# Patient Record
Sex: MALE | Race: WHITE | NOT HISPANIC OR LATINO | URBAN - METROPOLITAN AREA
[De-identification: names, ages, dates, MRNs, and addresses within clinical notes are randomized per-mention and may not be internally consistent; named-entity substitution may affect disease eponyms.]

---

## 2017-08-01 ENCOUNTER — OUTPATIENT (OUTPATIENT)
Dept: OUTPATIENT SERVICES | Facility: HOSPITAL | Age: 67
LOS: 1 days | End: 2017-08-01
Payer: MEDICAID

## 2017-08-01 PROCEDURE — G9001: CPT

## 2017-08-05 ENCOUNTER — EMERGENCY (EMERGENCY)
Facility: HOSPITAL | Age: 67
LOS: 1 days | End: 2017-08-05
Attending: EMERGENCY MEDICINE | Admitting: EMERGENCY MEDICINE
Payer: MEDICARE

## 2017-08-05 ENCOUNTER — INPATIENT (INPATIENT)
Facility: HOSPITAL | Age: 67
LOS: 4 days | Discharge: HOME | DRG: 41 | End: 2017-08-10
Attending: PSYCHIATRY & NEUROLOGY | Admitting: PSYCHIATRY & NEUROLOGY
Payer: MEDICARE

## 2017-08-05 ENCOUNTER — TRANSCRIPTION ENCOUNTER (OUTPATIENT)
Age: 67
End: 2017-08-05

## 2017-08-05 VITALS
SYSTOLIC BLOOD PRESSURE: 173 MMHG | RESPIRATION RATE: 13 BRPM | HEART RATE: 78 BPM | DIASTOLIC BLOOD PRESSURE: 103 MMHG | OXYGEN SATURATION: 98 %

## 2017-08-05 VITALS
SYSTOLIC BLOOD PRESSURE: 164 MMHG | OXYGEN SATURATION: 97 % | WEIGHT: 146.83 LBS | TEMPERATURE: 98 F | HEART RATE: 100 BPM | HEIGHT: 71 IN | DIASTOLIC BLOOD PRESSURE: 96 MMHG | RESPIRATION RATE: 12 BRPM

## 2017-08-05 VITALS
DIASTOLIC BLOOD PRESSURE: 93 MMHG | TEMPERATURE: 98 F | HEART RATE: 74 BPM | WEIGHT: 105.38 LBS | SYSTOLIC BLOOD PRESSURE: 173 MMHG | RESPIRATION RATE: 16 BRPM | OXYGEN SATURATION: 97 %

## 2017-08-05 DIAGNOSIS — I63.9 CEREBRAL INFARCTION, UNSPECIFIED: ICD-10-CM

## 2017-08-05 PROBLEM — Z00.00 ENCOUNTER FOR PREVENTIVE HEALTH EXAMINATION: Status: ACTIVE | Noted: 2017-08-05

## 2017-08-05 LAB
ALBUMIN SERPL ELPH-MCNC: 3.8 G/DL — SIGNIFICANT CHANGE UP (ref 3.3–5)
ALP SERPL-CCNC: 73 U/L — SIGNIFICANT CHANGE UP (ref 30–120)
ALT FLD-CCNC: 25 U/L DA — SIGNIFICANT CHANGE UP (ref 10–60)
ANION GAP SERPL CALC-SCNC: 9 MMOL/L — SIGNIFICANT CHANGE UP (ref 5–17)
APTT BLD: 30.7 SEC — SIGNIFICANT CHANGE UP (ref 27.5–37.4)
AST SERPL-CCNC: 15 U/L — SIGNIFICANT CHANGE UP (ref 10–40)
BASOPHILS # BLD AUTO: 0.1 K/UL — SIGNIFICANT CHANGE UP (ref 0–0.2)
BASOPHILS NFR BLD AUTO: 0.8 % — SIGNIFICANT CHANGE UP (ref 0–2)
BILIRUB SERPL-MCNC: 0.6 MG/DL — SIGNIFICANT CHANGE UP (ref 0.2–1.2)
BLD GP AB SCN SERPL QL: SIGNIFICANT CHANGE UP
BUN SERPL-MCNC: 12 MG/DL — SIGNIFICANT CHANGE UP (ref 7–23)
CALCIUM SERPL-MCNC: 8.7 MG/DL — SIGNIFICANT CHANGE UP (ref 8.4–10.5)
CHLORIDE SERPL-SCNC: 102 MMOL/L — SIGNIFICANT CHANGE UP (ref 96–108)
CK MB BLD-MCNC: 2.3 % — SIGNIFICANT CHANGE UP (ref 0–3.5)
CK MB CFR SERPL CALC: 1.6 NG/ML — SIGNIFICANT CHANGE UP (ref 0–3.6)
CK SERPL-CCNC: 70 U/L — SIGNIFICANT CHANGE UP (ref 39–308)
CO2 SERPL-SCNC: 28 MMOL/L — SIGNIFICANT CHANGE UP (ref 22–31)
CREAT SERPL-MCNC: 0.75 MG/DL — SIGNIFICANT CHANGE UP (ref 0.5–1.3)
EOSINOPHIL # BLD AUTO: 0.2 K/UL — SIGNIFICANT CHANGE UP (ref 0–0.5)
EOSINOPHIL NFR BLD AUTO: 2.2 % — SIGNIFICANT CHANGE UP (ref 0–6)
GLUCOSE SERPL-MCNC: 112 MG/DL — HIGH (ref 70–99)
HCT VFR BLD CALC: 44.3 % — SIGNIFICANT CHANGE UP (ref 39–50)
HGB BLD-MCNC: 15.2 G/DL — SIGNIFICANT CHANGE UP (ref 13–17)
INR BLD: 1.08 RATIO — SIGNIFICANT CHANGE UP (ref 0.88–1.16)
LYMPHOCYTES # BLD AUTO: 1.8 K/UL — SIGNIFICANT CHANGE UP (ref 1–3.3)
LYMPHOCYTES # BLD AUTO: 19.5 % — SIGNIFICANT CHANGE UP (ref 13–44)
MCHC RBC-ENTMCNC: 31.9 PG — SIGNIFICANT CHANGE UP (ref 27–34)
MCHC RBC-ENTMCNC: 34.4 GM/DL — SIGNIFICANT CHANGE UP (ref 32–36)
MCV RBC AUTO: 92.8 FL — SIGNIFICANT CHANGE UP (ref 80–100)
MONOCYTES # BLD AUTO: 0.8 K/UL — SIGNIFICANT CHANGE UP (ref 0–0.9)
MONOCYTES NFR BLD AUTO: 8.4 % — SIGNIFICANT CHANGE UP (ref 2–14)
NEUTROPHILS # BLD AUTO: 6.3 K/UL — SIGNIFICANT CHANGE UP (ref 1.8–7.4)
NEUTROPHILS NFR BLD AUTO: 69.1 % — SIGNIFICANT CHANGE UP (ref 43–77)
PLATELET # BLD AUTO: 214 K/UL — SIGNIFICANT CHANGE UP (ref 150–400)
POTASSIUM SERPL-MCNC: 3.9 MMOL/L — SIGNIFICANT CHANGE UP (ref 3.5–5.3)
POTASSIUM SERPL-SCNC: 3.9 MMOL/L — SIGNIFICANT CHANGE UP (ref 3.5–5.3)
PROT SERPL-MCNC: 7.1 G/DL — SIGNIFICANT CHANGE UP (ref 6–8.3)
PROTHROM AB SERPL-ACNC: 11.8 SEC — SIGNIFICANT CHANGE UP (ref 9.8–12.7)
RBC # BLD: 4.78 M/UL — SIGNIFICANT CHANGE UP (ref 4.2–5.8)
RBC # FLD: 11.1 % — SIGNIFICANT CHANGE UP (ref 10.3–14.5)
SODIUM SERPL-SCNC: 139 MMOL/L — SIGNIFICANT CHANGE UP (ref 135–145)
TROPONIN I SERPL-MCNC: 0 NG/ML — LOW (ref 0.02–0.06)
WBC # BLD: 9.1 K/UL — SIGNIFICANT CHANGE UP (ref 3.8–10.5)
WBC # FLD AUTO: 9.1 K/UL — SIGNIFICANT CHANGE UP (ref 3.8–10.5)

## 2017-08-05 PROCEDURE — 70498 CT ANGIOGRAPHY NECK: CPT | Mod: 26

## 2017-08-05 PROCEDURE — 70450 CT HEAD/BRAIN W/O DYE: CPT | Mod: 26,59

## 2017-08-05 PROCEDURE — 70450 CT HEAD/BRAIN W/O DYE: CPT | Mod: 26,77,59

## 2017-08-05 PROCEDURE — 99291 CRITICAL CARE FIRST HOUR: CPT | Mod: GC

## 2017-08-05 PROCEDURE — 70496 CT ANGIOGRAPHY HEAD: CPT | Mod: 26

## 2017-08-05 RX ORDER — ASPIRIN/CALCIUM CARB/MAGNESIUM 324 MG
81 TABLET ORAL DAILY
Qty: 0 | Refills: 0 | Status: DISCONTINUED | OUTPATIENT
Start: 2017-08-05 | End: 2017-08-06

## 2017-08-05 RX ORDER — ALTEPLASE 100 MG
6 KIT INTRAVENOUS ONCE
Qty: 0 | Refills: 0 | Status: COMPLETED | OUTPATIENT
Start: 2017-08-05 | End: 2017-08-05

## 2017-08-05 RX ORDER — PANTOPRAZOLE SODIUM 20 MG/1
40 TABLET, DELAYED RELEASE ORAL
Qty: 0 | Refills: 0 | Status: DISCONTINUED | OUTPATIENT
Start: 2017-08-05 | End: 2017-08-10

## 2017-08-05 RX ORDER — LABETALOL HCL 100 MG
10 TABLET ORAL ONCE
Qty: 0 | Refills: 0 | Status: COMPLETED | OUTPATIENT
Start: 2017-08-05 | End: 2017-08-05

## 2017-08-05 RX ORDER — ALTEPLASE 100 MG
53.9 KIT INTRAVENOUS ONCE
Qty: 0 | Refills: 0 | Status: COMPLETED | OUTPATIENT
Start: 2017-08-05 | End: 2017-08-05

## 2017-08-05 RX ORDER — LABETALOL HCL 100 MG
10 TABLET ORAL ONCE
Qty: 0 | Refills: 0 | Status: DISCONTINUED | OUTPATIENT
Start: 2017-08-05 | End: 2017-08-05

## 2017-08-05 RX ORDER — LABETALOL HCL 100 MG
10 TABLET ORAL
Qty: 0 | Refills: 0 | Status: DISCONTINUED | OUTPATIENT
Start: 2017-08-05 | End: 2017-08-10

## 2017-08-05 RX ORDER — ATORVASTATIN CALCIUM 80 MG/1
80 TABLET, FILM COATED ORAL AT BEDTIME
Qty: 0 | Refills: 0 | Status: DISCONTINUED | OUTPATIENT
Start: 2017-08-05 | End: 2017-08-10

## 2017-08-05 RX ADMIN — ALTEPLASE 360 MILLIGRAM(S): KIT at 10:48

## 2017-08-05 RX ADMIN — Medication 10 MILLIGRAM(S): at 12:45

## 2017-08-05 RX ADMIN — Medication 10 MILLIGRAM(S): at 10:00

## 2017-08-05 RX ADMIN — ALTEPLASE 53.9 MILLIGRAM(S): KIT at 10:48

## 2017-08-05 RX ADMIN — Medication 10 MILLIGRAM(S): at 11:15

## 2017-08-05 NOTE — ED PROVIDER NOTE - PMH
Acute (Undifferentiated) Schizophrenia    Asthma, Intrinsic    Chronic Latent Schizophrenia    Developmental Anomaly    Tachycardia

## 2017-08-05 NOTE — H&P ADULT - NSHPSOCIALHISTORY_GEN_ALL_CORE
Pt lives in a assisted living facility. His caretaker can be reached at [phone], he is actively involved with pt and is aware of his medication regimen

## 2017-08-05 NOTE — ED PROVIDER NOTE - MEDICAL DECISION MAKING DETAILS
66yo m with right sided hemiparesis and aphasia concerning for cva sp tpa tx from OSH. Neuro at bedside 66yo m with right sided hemiparesis and aphasia concerning for cva sp tpa tx from OSH. Neuro at bedside    LUCIA Kirby MD: Pt is a 68 y/o male with PMH schizophrenia, BPH, HTN who was at adult  today in his usual state of health, when he came back from the bathroom at 9am and staff noticed onset of difficulty walking and slurred speech. Pt was taken to [facility] ED where he was found to have right-sided hemiparesis, facial droop and slurred speech. Initial NIHSS of 9. Pt is sp tpa and tx to [facility] for further management. Pt received labtelol 10mg ivp x 2 for Sys  > 180. Pt currently aphasic. Repeat NIHSS=9. Neurology at bedside upon patient arrival (stroke code called) and pt sent for CTA head/neck to eval for large vessel occlusion amenable to intervention. CTAs returned negative for large vessel occlusion. Will continue to monitor BP while in ED. Per Neurology, plan is to admit for further eval and mgt.

## 2017-08-05 NOTE — H&P ADULT - NSHPPHYSICALEXAM_GEN_ALL_CORE
General - Young male in EMS stretcher, Calm, NAD  Neurological  MS - AAO to self not time or place. General - Young male in EMS stretcher, Calm, NAD  Neurological  MS - Awake and alert. Oriented to self but not time or place. Follows simple two step commands. Is able to write his name legibly on paper. Able to respond to yes and no questions appropriately with head shaking and nodding  CN - severe dysarthria, lower facial droop. EOMI, PERRL  Motor - 5/5 in tact throughout. No weakness throughout.   Sensory - full and symmetric light touch in tact throughout  Reflexes - 2+ biceps, brachioradialis and patellar  Coordination - off shoots FTN B/L.

## 2017-08-05 NOTE — DISCHARGE NOTE ADULT - CARE PLAN
Principal Discharge DX:	Stroke due to embolism of left middle cerebral artery  Goal:	resolution of symptoms  Instructions for follow-up, activity and diet:	c/w ASA/statin   follow up outpatient cardiology for loop monitoring   follow up outpatient neurology for long term management  Secondary Diagnosis:	Asthma, intrinsic  Goal:	long term control  Instructions for follow-up, activity and diet:	c/w home med Spiriva  folllow up outpatient PCP for long term management  Secondary Diagnosis:	Chronic latent schizophrenia  Goal:	long term control  Instructions for follow-up, activity and diet:	start Wellbutrin   c/w home med olanzapine  follow up outpatient psych for long term management  Secondary Diagnosis:	Secondary hypertension  Goal:	long term control  Instructions for follow-up, activity and diet:	c/w home med Losartan-HCTZ  follow up outpatient PCP for long term management

## 2017-08-05 NOTE — ED PROVIDER NOTE - CRITICAL CARE PROVIDED
additional history taking/consultation with other physicians/telephone consultation with the patient's family/interpretation of diagnostic studies/consult w/ pt's family directly relating to pts condition/documentation/direct patient care (not related to procedure)

## 2017-08-05 NOTE — ED ADULT NURSE NOTE - OBJECTIVE STATEMENT
Presents to ED from day program. Pt was last seen normal @ approx 0900. Exiting bathroom noted to have rt facial droop & no movement in rt arm/leg. Aphasic. Lungs clear. abd soft nontender. CM- RSR without ectopics noted.

## 2017-08-05 NOTE — STROKE CODE NOTE - NIH STROKE SCALE: 11. EXTINCTION AND INATTENTION, QM
(1) Visual, tactile, auditory, spatial, or personal inattention or extinction to bilateral simultaneous stimulation in one of the sensory modalities (0) No abnormality

## 2017-08-05 NOTE — ED ADULT NURSE REASSESSMENT NOTE - NS ED NURSE REASSESS COMMENT FT1
Spoke with Avery RN from [facility] to give report and instructed RN that he must come down for face-to-face report due to pt receiving tPA. Avery stated he was going to finish cleaning up a pt and then find another RN to cover him so he can come down for report. Charge RN Victor Hugo griffin.

## 2017-08-05 NOTE — DISCHARGE NOTE ADULT - CARE PROVIDER_API CALL
cardiology,   Phone: (   )    -  Fax: (   )    -    neurology,   Phone: (   )    -  Fax: (   )    -    psychiatry,   Phone: (   )    -  Fax: (   )    - Job Carrasquillo  [address] [place] [address]  Phone: [phone]  Fax: (   )    -    Karl Faye (), Cardiology; Internal Medicine  [address] [place] [address]  [address] [place] [id number]  Phone: [phone]  Fax: [phone]

## 2017-08-05 NOTE — ED PROVIDER NOTE - OBJECTIVE STATEMENT
Patient came in by ambulance apparently left house to go to day care center around 7:30 last seen normal at the center around 9 AM notice to have rt sided weak with aphasia coming out of the bathroom sat him down and  ambulance got called transported to our ER arrived 9:38 AM. on arrival unable to get history from patient got history from ambulance. Patient apparently has history of Schizophrenia, BPH, Cholesterol and HTN.

## 2017-08-05 NOTE — ED ADULT NURSE REASSESSMENT NOTE - NS ED NURSE REASSESS COMMENT FT1
Pt is resting comfortably in bed. Denies pain/discomfort. No change in mental status. +R facial droop. +Slurred speech. SESAY. Equal strengthx4. NAD noted. CM in place - NSR. Safety maintained.

## 2017-08-05 NOTE — DISCHARGE NOTE ADULT - PLAN OF CARE
resolution of symptoms c/w ASA/statin   follow up outpatient cardiology for loop monitoring   follow up outpatient neurology for long term management long term control c/w home med Spiriva  folllow up outpatient PCP for long term management start Wellbutrin   c/w home med olanzapine  follow up outpatient psych for long term management c/w home med Losartan-HCTZ  follow up outpatient PCP for long term management

## 2017-08-05 NOTE — H&P ADULT - ATTENDING COMMENTS
Mr. Hernandez is a 68 y/o man with vascular risk factors of age and HTN who presented with facial droop, right side weakness and inability to produce speech. Evaluated at [facility] and found with NIHSS=9, last know well 9am. Patient received tPa and transfer to [facility] for possible endovascular intervention. Upon evaluation by neurology services at [facility] noted improvement of hemiparesis with residual expressive aphasia NIHSS=8. CT, CTA head and neck performed and no area of major vessel occlusion. Patient not candidate for endovascular given absence of large vessel occlusion. Admitted to stroke unit s/p tPA.  Upon my evaluation patient awake, alert, attentive to examiner, follows 3 steps commands. Impaired fluency, adequate comprehension, impaired repetition. No apraxia. No CN deficit. Full strenght in all extremities upon manual muscle test. Sensory exam intact. No dysmetria.  NIHSS=7.   Neuroimaging reviewed and head CT with no area of acute intracranial pathology. CTA head and neck to me showed no evidence of large vessel occlusion, neither significant areas of stenosis intra or extracranially.  Given symptoms and neurological exam pertinent for expressive aphasia suspected LMCA territory dysfunction.     Impression: Cerebral embolism with cerebral infarction. L ICA distribution stroke - likely etiology being embolic stroke from a proximal source like cardiac/paradoxical source of embolism versus embolism from a possible hypercoagulable state.  Patient will benefit from the following: Mr. Hernandez is a 68 y/o man with vascular risk factors of age and HTN who presented with facial droop, right side weakness and inability to produce speech. Evaluated at [facility] and found with NIHSS=9, last know well 9am. Patient received tPa and transfer to [facility] for possible endovascular intervention. Upon evaluation by neurology services at [facility] noted improvement of hemiparesis with residual expressive aphasia NIHSS=8. CT, CTA head and neck performed and no area of major vessel occlusion. Patient not candidate for endovascular given absence of large vessel occlusion. Admitted to stroke unit s/p tPA.  Upon my evaluation patient awake, alert, attentive to examiner, follows 3 steps commands. Impaired fluency, adequate comprehension, impaired repetition. No apraxia. No CN deficit. Full strength in all extremities upon manual muscle test. Sensory exam intact. No dysmetria.  NIHSS=7.   Neuroimaging reviewed and head CT with no area of acute intracranial pathology. CTA head and neck to me showed no evidence of large vessel occlusion, neither significant areas of stenosis intra or extracranially.  Given symptoms and neurological exam pertinent for expressive aphasia suspected LMCA territory dysfunction.     Impression: Cerebral embolism with cerebral infarction. L ICA distribution stroke - likely etiology being embolic stroke from a proximal source like cardiac/paradoxical source of embolism versus embolism from a possible hypercoagulable state.    Patient will benefit from the followin. Admission to stroke unit for further monitoring and evaluation s/p tPa protocol  2. Secondary stroke prevention measures ASA 81mg daily 24 hrs after tPA if no hemorrhagic conversion, Statin therapy LDL goal <100,   3. First 24 hrs s/p tPA <180/105, then BP  gradual lowering <140/90  4. DVT prophylaxis pneumatic compression stockings; 24 hrs after tPa if no hemorrhage with SC Lovenox  5. Telemetry monitor for arrhythmia  6. TTE with bubble, r/out intracardiac thrombus or significant valvular dx  7. Dysphagia assessment  8.Speech evaluation, PT/OT eval.  9. Attain normothermia, tight glycemic control, monitor lipid profile, HgA1C  10. Consideration of further assessment regarding placement of ICM will be done during hospital stay  11. Brain MRI to evaluate stroke burden Mr. Hernandez is a 66 y/o man with vascular risk factors of age and HTN who presented with facial droop, right side weakness and inability to produce speech. Evaluated at [facility] and found with NIHSS=9, last know well 9am. Patient received tPa and transfer to [facility] for possible endovascular intervention. Upon evaluation by neurology services at [facility] noted improvement of hemiparesis with residual expressive aphasia NIHSS=8. CT, CTA head and neck performed and no area of major vessel occlusion. Patient not candidate for endovascular given absence of large vessel occlusion. Admitted to stroke unit s/p tPA.  Upon my evaluation patient awake, alert, attentive to examiner, follows 3 steps commands. Impaired fluency, adequate comprehension, impaired repetition. No apraxia. No CN deficit. Full strength in all extremities upon manual muscle test. Sensory exam intact. No dysmetria.  NIHSS=7.   Neuroimaging reviewed and head CT with no area of acute intracranial pathology. CTA head and neck to me showed no evidence of large vessel occlusion, neither significant areas of stenosis intra or extracranially.  Given symptoms and neurological exam pertinent for expressive aphasia suspected LMCA territory dysfunction.     Impression: Cerebral embolism with cerebral infarction. L ICA distribution stroke - likely etiology being embolic stroke from a proximal source like cardiac/paradoxical source of embolism versus embolism from a possible hypercoagulable state.    Patient will benefit from the followin. Admission to stroke unit for further monitoring and evaluation s/p tPa protocol  2. Secondary stroke prevention measures antiplatelet therapy 24 hrs after tPA if no hemorrhagic conversion consider Plavix; ASA failure, Statin therapy LDL goal <100,   3. First 24 hrs s/p tPA <180/105, then BP  gradual lowering <140/90  4. DVT prophylaxis pneumatic compression stockings; 24 hrs after tPa if no hemorrhage with SC Lovenox  5. Telemetry monitor for arrhythmia  6. TTE with bubble, r/out intracardiac thrombus or significant valvular dx  7. Dysphagia assessment  8.Speech evaluation, PT/OT eval.  9. Attain normothermia, tight glycemic control, monitor lipid profile, HgA1C  10. Consideration of further assessment regarding placement of ICM will be done during hospital stay  11. Brain MRI to evaluate stroke burden

## 2017-08-05 NOTE — DISCHARGE NOTE ADULT - PROVIDER TOKENS
FREE:[LAST:[cardiology],PHONE:[(   )    -],FAX:[(   )    -]],FREE:[LAST:[neurology],PHONE:[(   )    -],FAX:[(   )    -]],FREE:[LAST:[psychiatry],PHONE:[(   )    -],FAX:[(   )    -]] FREE:[LAST:[Carrasquillo],FIRST:[Job],PHONE:[phone]],FAX:[(   )    -],ADDRESS:[[address] [place] [address]]],TOKEN:'[id number]'

## 2017-08-05 NOTE — ED ADULT NURSE NOTE - OBJECTIVE STATEMENT
68yo male pt BIBA tx from [facility] presenting w/ slurred speech and R sided weakness. Pt was @  facility when staff witnessed pt developing slurred speech, R facial droop and R weakness as per EMS. Pt denies fall/LOC. Pt denies HA/N/V. tPA administered @ 1008, completed PTA @ 1112. Pt presents w/ R facial droop, slurred speech. No sensory deficits noted. SESAY. Equal strengthx4. No neuro check 66yo male pt BIBA tx from [facility] presenting w/ slurred speech and R sided weakness. Pt was @  facility when staff witnessed pt developing slurred speech, R facial droop and R weakness as per EMS. Pt denies fall/LOC. Pt denies HA/N/V/CP/SOB. tPA administered @ 1008, completed PTA @ 1112, using 65kg as weight as per EMS. Pt presents w/ R facial droop, slurred speech. No sensory deficits noted. SESAY. Equal strengthx4. No neuro check/VS documentation provided by sending facility. PERRLA 3mm brisk. Pt taken to directly to CT. MDs at bedside. CODE STROKE @ 1135. Safety maintained. 68yo male pt BIBA tx from [facility] presenting w/ slurred speech and R sided weakness. Pt was @  facility when staff witnessed pt developing slurred speech, R facial droop and R weakness as per EMS. Pt denies fall/LOC. Pt denies HA/N/V/CP/SOB. tPA administered @ 1008, completed PTA @ 1112, using 65kg as weight as per EMS. Pt presents w/ R facial droop, severe slurred speech/aphagia. No sensory deficits noted. SESAY. Equal strengthx4. No neuro check/VS documentation provided by sending facility. PERRLA 3mm brisk. Pt taken to directly to CT. MDs at bedside. CODE STROKE @ 1135. Safety maintained.

## 2017-08-05 NOTE — DISCHARGE NOTE ADULT - HOSPITAL COURSE
This 67 year old man presented from [facility] s/p tPA to [facility] for new onset right-sided weakness & slurred speech (NIHSS 9). After extensive workup, which included CT head, CTA head/neck, MRI head, labs and consults by cardiology, neurology, and psychiatry, patient was diagnosed with right hemiparesis w/ dysarthria 2/2 left MCA ischemic infarct possibly embolic, cardiac vs. paradoxical in source. Patient's condition was management with tPA, secondary stroke prevention w/ ASA/statin, cardiac loop monitoring, and PT/OT/SS, which recommended continued D1 nectar feeds w/ assistance followed up with outpatient reevaluation. Any other comorbidities were actively monitored and managed with both inpatient and/or home meds as indicated and it is recommended that patient follow up outpatient for long term management of these conditions. Any electrolyte abnormalities were monitored closely and replenished as needed. Patient to follow up w/ outpatient cardiology for continued loop monitoring; speech & swallow for outpatient reevaluation of feeds; neurology for long term management of current condition and PCP for long term management of all conditions. Patient's vitals are stable and is cleared for discharge.

## 2017-08-05 NOTE — DISCHARGE NOTE ADULT - DISCHARGE TO
group Patient discharged to home at this time, taught back medication instructions, follow up care and appointment instructions, and when to go to emergency department.  IV access removed.

## 2017-08-05 NOTE — ED PROVIDER NOTE - PROGRESS NOTE DETAILS
Case discuss with Dr Farnsworth at [facility] will transfer patient for further mgt.

## 2017-08-05 NOTE — H&P ADULT - NSHPLABSRESULTS_GEN_ALL_CORE
Labs:   15.2   9.1   )-----------( 214      ( 05 Aug 2017 09:56 )             44.3   08-05    139  |  102  |  12  ----------------------------<  112<H>  3.9   |  28  |  0.75    Ca    8.7      05 Aug 2017 09:54    TPro  7.1  /  Alb  3.8  /  TBili  0.6  /  DBili  x   /  AST  15  /  ALT  25  /  AlkPhos  73  08-05      Imaging:  CT head at Outside hospital (8/5) - No  acute  intracranial  hemorrhage,  mass  effect,  or  midline  shift.    CT head (8/5) - Unchanged  mild  volume  loss  with  nonspecific  foci  of  decreased  attenuation  in the  white  matter  likely  microvascular  disease  slightly  more  pronounced within  the  left  subinsular  cortex,  lentiform  nuclei  and  left  frontal  white matter  new  foci  of  ischemic  change  are  not  excluded,  there  is  no  acute hemorrhage  or  midline  shift.  DWI  MR  is  more  sensitive  for  new  ischemic change  and  may  be  obtained  as  clinically  warranted.    CTA head/neck (8/5) -  demonstrates  atherosclerotic  vascular  opacification  of  the  left  petrous and  bilateral  internal  carotid  arteries,  there  is  a  punctate  calcification within  the  left  frontal  lobe,  there  is  a  slight  decrease  in  size  and  number of  the  distal  left  MCA  branches  relative  to  the  right  side  without  acute hemorrhage  or  midline  shift.

## 2017-08-05 NOTE — ED PROVIDER NOTE - OBJECTIVE STATEMENT
Pt seen and evaluated upon arrival - Code Stroke activated.  66yo M hx of schizophrenia, BPH, HTN co of right hemiparesis. Pt was at assisted living ? and had witnessed weakness, facial droop and slurred speech at 900am. Was BIBEMS to [facility] and found to have initial NIHSS of 9. Pt is sp tpa and tx to [facility] for further management. Pt received labtelol 10mg ivp x 2 for Sys  > 180. Pt currently aphasic.

## 2017-08-05 NOTE — DISCHARGE NOTE ADULT - NS AS DC STROKE ED MATERIALS
Stroke Education Booklet/Call 911 for Stroke/Prescribed Medications/Risk Factors for Stroke/Need for Followup After Discharge/Stroke Warning Signs and Symptoms

## 2017-08-05 NOTE — DISCHARGE NOTE ADULT - MEDICATION SUMMARY - MEDICATIONS TO TAKE
I will START or STAY ON the medications listed below when I get home from the hospital:    aspirin 81 mg oral tablet  -- 1 tab(s) by mouth once a day  -- Indication: For Cerebrovascular accident (CVA), unspecified mechanism    lisinopril 10 mg oral tablet  -- 1 tab(s) by mouth once a day  -- Indication: For Hypertension    Flomax 0.4 mg oral capsule  -- 1 cap(s) by mouth once a day  -- Indication: For BPH    atorvastatin 40 mg oral tablet  -- 1 tab(s) by mouth once a day (at bedtime)  -- Indication: For Cerebrovascular accident (CVA), unspecified mechanism    losartan-hydrochlorothiazide 100mg-25mg oral tablet  -- 1 tab(s) by mouth once a day  -- Indication: For Hypertension    OLANZapine 5 mg oral tablet  -- 1 tab(s) by mouth once a day  -- Indication: For Undifferentiated schizophrenia    Spiriva 18 mcg inhalation capsule  -- 1 cap(s) inhaled once a day  -- Indication: For shortness of breath    buPROPion 75 mg oral tablet  -- 1 tab(s) by mouth 2 times a day  -- Indication: For Undifferentiated schizophrenia    multivitamin  --   once a day  -- Indication: For Health maintainence I will START or STAY ON the medications listed below when I get home from the hospital:    aspirin 81 mg oral tablet  -- 1 tab(s) by mouth once a day  -- Indication: For Cerebrovascular accident (CVA), unspecified mechanism    lisinopril 10 mg oral tablet  -- 1 tab(s) by mouth once a day  -- Indication: For Hypertension    Flomax 0.4 mg oral capsule  -- 1 cap(s) by mouth once a day  -- Indication: For BPH    atorvastatin 40 mg oral tablet  -- 1 tab(s) by mouth once a day (at bedtime)  -- Indication: For Cerebrovascular accident (CVA), unspecified mechanism    atorvastatin 40 mg oral tablet  -- 1 tab(s) by mouth once a day (at bedtime)  -- Indication: For Secondary hypertension    losartan-hydrochlorothiazide 100mg-25mg oral tablet  -- 1 tab(s) by mouth once a day  -- Indication: For Hypertension    OLANZapine 5 mg oral tablet  -- 1 tab(s) by mouth once a day  -- Indication: For Undifferentiated schizophrenia    Spiriva 18 mcg inhalation capsule  -- 1 cap(s) inhaled once a day  -- Indication: For shortness of breath    buPROPion 75 mg oral tablet  -- 1 tab(s) by mouth 2 times a day  -- Indication: For Undifferentiated schizophrenia    multivitamin  --   once a day  -- Indication: For Health maintainence

## 2017-08-05 NOTE — H&P ADULT - ASSESSMENT
Plan:  Admit patient to the Stroke floor service for stroke/TIA workup  Start ASA 81mg daily  Start Atorvastatin 80mg PO  Hold Antihypertensive medications for now, allow for permissive hypertension for the first 24hours  Notify MD order for BP > 220/110, and HR less than 50   Send Lipid panel and HBa1c  Get MRI brain WO  Get TTE   Telemetry monitoring  DVT prophylaxis   Vitals and Neuroassesment checks Q4Hrs  Repeat CTH if acute change in mental status   Bedside Dysphagia evaluation   PT/OT evaluation. Plan:  Admit patient to the Stroke floor service for stroke/TIA workup  Start ASA 81mg daily  Start Atorvastatin 80mg PO  Hold Antihypertensive medications for now, allow for permissive hypertension for the first 24hours  Hold PO home medications until passed dysphagia screen.  Notify MD order for BP > 220/110, and HR less than 50   Send Lipid panel and HBa1c  Get MRI brain WO  Get TTE   Telemetry monitoring  DVT prophylaxis   Vitals and Neuroassesment checks Q4Hrs  Repeat CTH if acute change in mental status   Bedside Dysphagia evaluation   PT/OT evaluation Pt is a 67 year old male with a phx of schizophrenia, BPH, and HTN. Pt was at assisted living facility this morning and had witnessed onset of left sided facial droop, slurred speech and right sided hemiparesis at 9:00am. He was then taken to [facility] and found to have initial NIHSS of 9. CT head officially reported at 9:51 and he received a TPA bolus and completed full dose of TPa at 11AM. Pt has having BP greater than 180 systolic therefore received Labetalol 10mg ivp x 2. He was transferred to [facility] for further workup. On physical exam at the time of presentation pt had no drift X 4 and 5/5 strength throughout. Seemingly the right sided hemiparesis resolved. However he still had productive aphasia and has severe dysarthria. After Tpa NIHSS was 8.     Plan:  Admit patient to the Stroke floor service for stroke/TIA workup  S/p TPA protocol  Repeat Ct head within 24 hours and start ASA 81mg daily and DVT prophylaxis if negative  Start Atorvastatin 80mg PO  Hold Antihypertensive medications for now, allow for permissive hypertension for the first 24 hours  Hold PO home medications until passed dysphagia screen.  Notify MD order for BP > 220/110, and HR less than 50   Send Lipid panel and HBa1c  Get MRI brain WO  Get TTE   Telemetry monitoring  DVT prophylaxis   Vitals and Neuroassesment checks Q4Hrs  Repeat CTH if acute change in mental status   Bedside Dysphagia evaluation   PT/OT evaluation

## 2017-08-05 NOTE — H&P ADULT - HISTORY OF PRESENT ILLNESS
Pt is a 67 year old male with a phx of schizophrenia, BPH, and HTN. Pt was at assisted living facility this morning and had witnessed onset of left sided facial droop, slurred speech at 9:00am. He also had right sided hemiparesis. He was then taken to [facility] and found to have initial NIHSS of 9. CT head oficically reported at 9:51 and he recieved a TPA bolus and completed full dose of TPa at 11AM. Pt has having BP greater than 180 systolice therefore received Labetalol 10mg ivp x 2. He was transferred to [facility] for further workup. On physical exam at the time of presentation pt had no drift X 4 and 5/5 strength throughout. Seemingly the right sided hemiparesis resolved. However he still had productive aphasia and has severe dysarthria. Pt is a 67 year old male with a phx of schizophrenia, BPH, and HTN. Pt was at assisted living facility this morning and had witnessed onset of left sided facial droop, slurred speech at 9:00am. He also had right sided hemiparesis. He was then taken to [facility] and found to have initial NIHSS of 9. CT head oficically reported at 9:51 and he recieved a TPA bolus and completed full dose of TPa at 11AM. Pt has having BP greater than 180 systolice therefore received Labetalol 10mg ivp x 2. He was transferred to [facility] for further workup. On physical exam at the time of presentation pt had no drift X 4 and 5/5 strength throughout. Seemingly the right sided hemiparesis resolved. However he still had productive aphasia and has severe dysarthria.     NIHSS - 8  MRS - 4 Pt is a 67 year old male with a phx of schizophrenia, BPH, and HTN. Pt was at assisted living facility this morning and had witnessed onset of left sided facial droop, slurred speech and right sided hemiparesis at 9:00am. He was then taken to [facility] and found to have initial NIHSS of 9. CT head officially reported at 9:51 and he received a TPA bolus and completed full dose of TPa at 11AM. Pt has having BP greater than 180 systolic therefore received Labetalol 10mg ivp x 2. He was transferred to [facility] for further workup. On physical exam at the time of presentation pt had no drift X 4 and 5/5 strength throughout. Seemingly the right sided hemiparesis resolved. However he still had productive aphasia and has severe dysarthria.     NIHSS - 8  MRS - 4

## 2017-08-05 NOTE — ED ADULT NURSE REASSESSMENT NOTE - NS ED NURSE REASSESS COMMENT FT1
spoke with brother and consent given for tpa  and spoke edwin velasco and aware of riskd pt moving right arm and leg a little more still aphasic  benito carmela 1

## 2017-08-05 NOTE — DISCHARGE NOTE ADULT - PATIENT PORTAL LINK FT
“You can access the Follow[employer] Patient Portal, offered by [facility], by registering with the following website: [email or web address]”

## 2017-08-06 DIAGNOSIS — F20.3 UNDIFFERENTIATED SCHIZOPHRENIA: ICD-10-CM

## 2017-08-06 LAB
ANION GAP SERPL CALC-SCNC: 15 MMOL/L — SIGNIFICANT CHANGE UP (ref 5–17)
BUN SERPL-MCNC: 13 MG/DL — SIGNIFICANT CHANGE UP (ref 7–23)
CALCIUM SERPL-MCNC: 8.7 MG/DL — SIGNIFICANT CHANGE UP (ref 8.4–10.5)
CHLORIDE SERPL-SCNC: 102 MMOL/L — SIGNIFICANT CHANGE UP (ref 96–108)
CHOLEST SERPL-MCNC: 152 MG/DL — SIGNIFICANT CHANGE UP (ref 10–199)
CO2 SERPL-SCNC: 21 MMOL/L — LOW (ref 22–31)
CREAT SERPL-MCNC: 0.86 MG/DL — SIGNIFICANT CHANGE UP (ref 0.5–1.3)
GLUCOSE SERPL-MCNC: 88 MG/DL — SIGNIFICANT CHANGE UP (ref 70–99)
HBA1C BLD-MCNC: 5.4 % — SIGNIFICANT CHANGE UP (ref 4–5.6)
HCT VFR BLD CALC: 43.4 % — SIGNIFICANT CHANGE UP (ref 39–50)
HDLC SERPL-MCNC: 50 MG/DL — SIGNIFICANT CHANGE UP (ref 40–125)
HGB BLD-MCNC: 15 G/DL — SIGNIFICANT CHANGE UP (ref 13–17)
LIPID PNL WITH DIRECT LDL SERPL: 83 MG/DL — SIGNIFICANT CHANGE UP
MCHC RBC-ENTMCNC: 33.6 PG — SIGNIFICANT CHANGE UP (ref 27–34)
MCHC RBC-ENTMCNC: 34.6 GM/DL — SIGNIFICANT CHANGE UP (ref 32–36)
MCV RBC AUTO: 97 FL — SIGNIFICANT CHANGE UP (ref 80–100)
PLATELET # BLD AUTO: 203 K/UL — SIGNIFICANT CHANGE UP (ref 150–400)
POTASSIUM SERPL-MCNC: 4.3 MMOL/L — SIGNIFICANT CHANGE UP (ref 3.5–5.3)
POTASSIUM SERPL-SCNC: 4.3 MMOL/L — SIGNIFICANT CHANGE UP (ref 3.5–5.3)
RBC # BLD: 4.47 M/UL — SIGNIFICANT CHANGE UP (ref 4.2–5.8)
RBC # FLD: 11.1 % — SIGNIFICANT CHANGE UP (ref 10.3–14.5)
SODIUM SERPL-SCNC: 138 MMOL/L — SIGNIFICANT CHANGE UP (ref 135–145)
TOTAL CHOLESTEROL/HDL RATIO MEASUREMENT: 3 RATIO — LOW (ref 3.4–9.6)
TRIGL SERPL-MCNC: 93 MG/DL — SIGNIFICANT CHANGE UP (ref 10–149)
WBC # BLD: 10.3 K/UL — SIGNIFICANT CHANGE UP (ref 3.8–10.5)
WBC # FLD AUTO: 10.3 K/UL — SIGNIFICANT CHANGE UP (ref 3.8–10.5)

## 2017-08-06 PROCEDURE — 70450 CT HEAD/BRAIN W/O DYE: CPT | Mod: 26

## 2017-08-06 PROCEDURE — 70551 MRI BRAIN STEM W/O DYE: CPT | Mod: 26

## 2017-08-06 PROCEDURE — 99223 1ST HOSP IP/OBS HIGH 75: CPT

## 2017-08-06 RX ORDER — ASPIRIN/CALCIUM CARB/MAGNESIUM 324 MG
300 TABLET ORAL DAILY
Qty: 0 | Refills: 0 | Status: DISCONTINUED | OUTPATIENT
Start: 2017-08-06 | End: 2017-08-07

## 2017-08-06 RX ORDER — ENOXAPARIN SODIUM 100 MG/ML
40 INJECTION SUBCUTANEOUS ONCE
Qty: 0 | Refills: 0 | Status: COMPLETED | OUTPATIENT
Start: 2017-08-06 | End: 2017-08-06

## 2017-08-06 RX ORDER — SODIUM CHLORIDE 9 MG/ML
1000 INJECTION INTRAMUSCULAR; INTRAVENOUS; SUBCUTANEOUS
Qty: 0 | Refills: 0 | Status: DISCONTINUED | OUTPATIENT
Start: 2017-08-06 | End: 2017-08-09

## 2017-08-06 RX ADMIN — SODIUM CHLORIDE 75 MILLILITER(S): 9 INJECTION INTRAMUSCULAR; INTRAVENOUS; SUBCUTANEOUS at 17:55

## 2017-08-06 RX ADMIN — ENOXAPARIN SODIUM 40 MILLIGRAM(S): 100 INJECTION SUBCUTANEOUS at 17:52

## 2017-08-06 RX ADMIN — SODIUM CHLORIDE 75 MILLILITER(S): 9 INJECTION INTRAMUSCULAR; INTRAVENOUS; SUBCUTANEOUS at 04:51

## 2017-08-06 RX ADMIN — Medication 300 MILLIGRAM(S): at 17:52

## 2017-08-06 NOTE — SWALLOW BEDSIDE ASSESSMENT ADULT - SLP GENERAL OBSERVATIONS
Failed dysphagia screen 8/5 @ 12:05. Patient unable to maintain control of saliva, slurred speech, + wet vocal quality post intake of 5ml of water. Failed dysphagia screen 8/5 @ 12:05. Patient unable to maintain control of saliva, slurred speeh, + wet vocal quality post intake of 5ml of water. Nods y/n ~75%, attempting to imitate vowels, occasional pointing/gesturing to indicate basic wants/needs, + eye contact.

## 2017-08-06 NOTE — BEHAVIORAL HEALTH ASSESSMENT NOTE - NSBHCONSFOLLOWNEEDS_PSY_A_CORE
Patient needs further psychiatric safety assessment prior to discharge no psychiatric contraindications to discharge

## 2017-08-06 NOTE — SWALLOW BEDSIDE ASSESSMENT ADULT - PHARYNGEAL PHASE
Multiple swallows/x3-4 audible swallows, significantly delayed pharyngeal swallows/Delayed pharyngeal swallow significantly delayed pharyngeal swallows, multiple swallows x3

## 2017-08-06 NOTE — BEHAVIORAL HEALTH ASSESSMENT NOTE - NSBHCONSULTMEDS_PSY_A_CORE FT
1. c/w home psych meds for now    2. Additional collateral needed to determine pt's baseline    3. Check TSH, b12, folate, RPR, HIV    4. CL psych will follow 1. c/w home psych meds for now.  Would obtain med rec from caregiver when available.  Messages left, awaiting callback.    2. Check TSH, b12, folate, RPR, HIV    3. CL psych will follow

## 2017-08-06 NOTE — BEHAVIORAL HEALTH ASSESSMENT NOTE - NSBHCHARTREVIEWIMAGING_PSY_A_CORE FT
< from: CT Brain Stroke Protocol (08.05.17 @ 12:01) >    IMPRESSION:       Unchanged mild volume loss with nonspecific foci of decreased attenuation   in the white matter likely microvascular disease slightly more pronounced   within the left subinsular cortex, lentiform nuclei and left frontal   white matter new foci of ischemic change are not excluded, there is no   acute hemorrhage or midline shift. DWI MR is more sensitive for new   ischemic change and may be obtained as clinically warranted.    CTA demonstrates atherosclerotic vascular opacification of the left   petrous and bilateral internal carotid arteries, there is a punctate   calcification within the left frontal lobe, there is a slight decrease in   size and number of the distal left MCA branches relative to the right   side without acute hemorrhage or midline shift.    Findings discussed with stroke neurology Dr. Imani Carrasquillo at immediate   review on 8/5/ 17 at 12:10 PM.      < end of copied text >

## 2017-08-06 NOTE — BEHAVIORAL HEALTH ASSESSMENT NOTE - SUMMARY
67M with PPHx schizophrenia per chart review, PMH significant for BPH, and HTN, sent from [facility] program with left sided facial droop, slurred speech and right sided hemiparesis at 9:00am, taken to [facility] and received TPA, transferred to [facility] for further workup.  Pt has been mute which primary team feels does not correlate clinically; psych consulted to assess if mutism may be due to schizophrenia.  Pt communicating by nodding to yes/no questions- denies depressed mood, SI/HI, paranoia, AVH, or substance abuse.  Multiple attempts made to obtain collateral to determine baseline, but none were available at this time.  Will defer to primary psych team in the AM. 67M with PPHx schizophrenia per chart review, PMH significant for BPH, and HTN, sent from [facility] program with left sided facial droop, slurred speech and right sided hemiparesis at 9:00am, taken to [facility] and received TPA, transferred to [place] for further workup.  Pt has been mute which primary team feels does not correlate clinically; psych consulted to assess if mutism may be due to schizophrenia.  Pt communicating by nodding to yes/no questions- denies depressed mood, SI/HI, paranoia, AVH, or substance abuse.  Per family, mutism is a new/acute symptom.

## 2017-08-06 NOTE — BEHAVIORAL HEALTH ASSESSMENT NOTE - HPI (INCLUDE ILLNESS QUALITY, SEVERITY, DURATION, TIMING, CONTEXT, MODIFYING FACTORS, ASSOCIATED SIGNS AND SYMPTOMS)
67M with PPHx schizophrenia per chart review, PMH significant for BPH, and HTN, sent from [facility] care program with left sided facial droop, slurred speech and right sided hemiparesis at 9:00am, taken to [facility] and received TPA, transferred to [place] for further workup.  Pt has been mute which primary team feels does not correlate clinically; psych consulted to assess if mutism may be due to schizophrenia.    On exam, pt is mute but cooperative, appears to understand questions asked, can nod yes or no appropriately.  Denies feeling confused, denies SIIP/HIIP or paranoia.  Endorses feeling safe in the hospital.  Denies AVH.  Denies substance abuse.  When pt was asked to verbalize his answers, he gestured to himself in a helpless manner to express that he cannot.    Multiple attempts were made to contact pt's listed collaterals to determine baseline and obtain further history, but no one was available.  Messages were left requesting callback. 67M with PPHx schizophrenia per chart review, PMH significant for BPH, and HTN, sent from [facility] program with left sided facial droop, slurred speech and right sided hemiparesis at 9:00am, taken to [facility] and received TPA, transferred to [place] for further workup.  Pt has been mute which primary team feels does not correlate clinically; psych consulted to assess if mutism may be due to schizophrenia.    On exam, pt is mute but cooperative, appears to understand questions asked, can nod yes or no appropriately.  Denies feeling confused, denies SIIP/HIIP or paranoia.  Endorses feeling safe in the hospital.  Denies AVH.  Denies substance abuse.  When pt was asked to verbalize his answers, he gestured to himself in a helpless manner to express that he cannot.    Collateral obtained from pt's brother Get David ([phone]) who states pt's mutism began acutely yesterday.  At baseline, pt can verbalize his needs, speaks in full sentences.  Has been psychiatrically stable for many years without psych admissions.  No h/o suicide attempts or violence.  Attends day care 7d/week and lives in group home with a few peers, under the supervision of a caregiver.  Goes to dinner with family members regularly.  Pt's father passed away 1 week ago for which the pt expressed being "sad," but handled well under the circumstances.  States pt is typically "a sweet simple anju".

## 2017-08-06 NOTE — BEHAVIORAL HEALTH ASSESSMENT NOTE - NSBHCHARTREVIEWVS_PSY_A_CORE FT
T(C): 36.4 (08-06-17 @ 09:40), Max: 36.6 (08-05-17 @ 13:40)  HR: 97 (08-06-17 @ 12:08) (71 - 97)  BP: 158/107 (08-06-17 @ 12:08) (125/72 - 169/100)  RR: 13 (08-06-17 @ 12:08) (11 - 18)  SpO2: 96% (08-06-17 @ 12:08) (93% - 99%)  Wt(kg): --

## 2017-08-06 NOTE — SWALLOW BEDSIDE ASSESSMENT ADULT - SWALLOW EVAL: RECOMMENDED DIET
NPO, with non-oral nutrition/hydration/medications pending results of MBS which is scheduled for 8/7/17.

## 2017-08-06 NOTE — SWALLOW BEDSIDE ASSESSMENT ADULT - SWALLOW EVAL: ORAL MUSCULATURE
+ facial symmetry, limited exam 2/2 reduced command following/unable to assess due to poor participation/comprehension

## 2017-08-06 NOTE — SWALLOW BEDSIDE ASSESSMENT ADULT - SWALLOW EVAL: DIAGNOSIS
Pt presents with an oral and suspected pharyngeal dysphagia characterized by delayed oral transit time, delayed pharyngeal swallows and multiple sallows indicative of post swallow residue.

## 2017-08-06 NOTE — BEHAVIORAL HEALTH ASSESSMENT NOTE - NSBHCHARTREVIEWLAB_PSY_A_CORE FT
15.0   10.3  )-----------( 203      ( 06 Aug 2017 05:07 )             43.4     08-06    138  |  102  |  13  ----------------------------<  88  4.3   |  21<L>  |  0.86    Ca    8.7      06 Aug 2017 05:07    TPro  7.1  /  Alb  3.8  /  TBili  0.6  /  DBili  x   /  AST  15  /  ALT  25  /  AlkPhos  73  08-05

## 2017-08-06 NOTE — SWALLOW BEDSIDE ASSESSMENT ADULT - COMMENTS
NIHSS - 8  MRS - 4  8/5: CTH and CTA head and neck: Unchanged mild volume loss with nonspecific foci of decreased attenuation   in the white matter likely microvascular disease slightly more pronounced   within the left subinsular cortex, lentiform nuclei and left frontal   white matter new foci of ischemic change are not excluded, there is no   acute hemorrhage or midline shift. DWI MR is more sensitive for new   ischemic change and may be obtained as clinically warranted.    CTA demonstrates atherosclerotic vascular opacification of the left   petrous and bilateral internal carotid arteries, there is a punctate   calcification within the left frontal lobe, there is a slight decrease in   size and number of the distal left MCA branches relative to the right   side without acute hemorrhage or midline shift.

## 2017-08-06 NOTE — SWALLOW BEDSIDE ASSESSMENT ADULT - SLP PERTINENT HISTORY OF CURRENT PROBLEM
Pt is a 67 year old male with a phx of schizophrenia, BPH, and HTN. Pt was at assisted living facility this morning and had witnessed onset of left sided facial droop, slurred speech and right sided hemiparesis at 9:00am. He was then taken to [facility] and found to have initial NIHSS of 9. CT head officially reported at 9:51 and he received a TPA bolus and completed full dose of TPa at 11AM. Pt has having BP greater than 180 systolic therefore received Labetalol 10mg ivp x 2. He was transferred to [facility] for further workup. On physical exam at the time of presentation pt had no drift X 4 and 5/5 strength throughout. Seemingly the right sided hemiparesis resolved. However he still had productive aphasia and has severe dysarthria.

## 2017-08-06 NOTE — BEHAVIORAL HEALTH ASSESSMENT NOTE - OTHER PAST PSYCHIATRIC HISTORY (INCLUDE DETAILS REGARDING ONSET, COURSE OF ILLNESS, INPATIENT/OUTPATIENT TREATMENT)
h/o schizophrenia per chart review, lives in group home/adult day care program h/o schizophrenia per chart review, lives in group home/adult day care program, remote psych admissions (none in many years), no h/o suicidality

## 2017-08-06 NOTE — BEHAVIORAL HEALTH ASSESSMENT NOTE - NSBHCHARTREVIEWINVESTIGATE_PSY_A_CORE FT
< from: 12 Lead ECG (08.05.17 @ 12:03) >      Ventricular Rate 76 BPM    Atrial Rate 76 BPM    P-R Interval 178 ms    QRS Duration 94 ms     ms    QTc 441 ms    P Axis 65 degrees    R Axis -3 degrees    T Axis 44 degrees    Diagnosis Line NORMAL SINUS RHYTHM  POSSIBLE LEFT ATRIAL ENLARGEMENT    < end of copied text >

## 2017-08-07 DIAGNOSIS — I63.9 CEREBRAL INFARCTION, UNSPECIFIED: ICD-10-CM

## 2017-08-07 DIAGNOSIS — R00.0 TACHYCARDIA, UNSPECIFIED: ICD-10-CM

## 2017-08-07 DIAGNOSIS — I10 ESSENTIAL (PRIMARY) HYPERTENSION: ICD-10-CM

## 2017-08-07 DIAGNOSIS — R69 ILLNESS, UNSPECIFIED: ICD-10-CM

## 2017-08-07 LAB
ANION GAP SERPL CALC-SCNC: 14 MMOL/L — SIGNIFICANT CHANGE UP (ref 5–17)
BUN SERPL-MCNC: 15 MG/DL — SIGNIFICANT CHANGE UP (ref 7–23)
CALCIUM SERPL-MCNC: 8 MG/DL — LOW (ref 8.4–10.5)
CHLORIDE SERPL-SCNC: 105 MMOL/L — SIGNIFICANT CHANGE UP (ref 96–108)
CO2 SERPL-SCNC: 19 MMOL/L — LOW (ref 22–31)
CREAT SERPL-MCNC: 0.68 MG/DL — SIGNIFICANT CHANGE UP (ref 0.5–1.3)
FOLATE SERPL-MCNC: >20 NG/ML — SIGNIFICANT CHANGE UP (ref 4.8–24.2)
GLUCOSE SERPL-MCNC: 75 MG/DL — SIGNIFICANT CHANGE UP (ref 70–99)
HCT VFR BLD CALC: 40.6 % — SIGNIFICANT CHANGE UP (ref 39–50)
HGB BLD-MCNC: 13.9 G/DL — SIGNIFICANT CHANGE UP (ref 13–17)
HIV 1+2 AB+HIV1 P24 AG SERPL QL IA: SIGNIFICANT CHANGE UP
MCHC RBC-ENTMCNC: 33.2 PG — SIGNIFICANT CHANGE UP (ref 27–34)
MCHC RBC-ENTMCNC: 34.4 GM/DL — SIGNIFICANT CHANGE UP (ref 32–36)
MCV RBC AUTO: 96.5 FL — SIGNIFICANT CHANGE UP (ref 80–100)
PLATELET # BLD AUTO: 188 K/UL — SIGNIFICANT CHANGE UP (ref 150–400)
POTASSIUM SERPL-MCNC: 4.1 MMOL/L — SIGNIFICANT CHANGE UP (ref 3.5–5.3)
POTASSIUM SERPL-SCNC: 4.1 MMOL/L — SIGNIFICANT CHANGE UP (ref 3.5–5.3)
RBC # BLD: 4.2 M/UL — SIGNIFICANT CHANGE UP (ref 4.2–5.8)
RBC # FLD: 10.9 % — SIGNIFICANT CHANGE UP (ref 10.3–14.5)
SODIUM SERPL-SCNC: 138 MMOL/L — SIGNIFICANT CHANGE UP (ref 135–145)
TSH SERPL-MCNC: 0.93 UIU/ML — SIGNIFICANT CHANGE UP (ref 0.27–4.2)
VIT B12 SERPL-MCNC: 1100 PG/ML — HIGH (ref 243–894)
WBC # BLD: 8.5 K/UL — SIGNIFICANT CHANGE UP (ref 3.8–10.5)
WBC # FLD AUTO: 8.5 K/UL — SIGNIFICANT CHANGE UP (ref 3.8–10.5)

## 2017-08-07 PROCEDURE — 74230 X-RAY XM SWLNG FUNCJ C+: CPT | Mod: 26

## 2017-08-07 PROCEDURE — 99232 SBSQ HOSP IP/OBS MODERATE 35: CPT

## 2017-08-07 PROCEDURE — 99233 SBSQ HOSP IP/OBS HIGH 50: CPT

## 2017-08-07 RX ORDER — OLANZAPINE 15 MG/1
20 TABLET, FILM COATED ORAL AT BEDTIME
Qty: 0 | Refills: 0 | Status: DISCONTINUED | OUTPATIENT
Start: 2017-08-07 | End: 2017-08-10

## 2017-08-07 RX ORDER — ASPIRIN/CALCIUM CARB/MAGNESIUM 324 MG
81 TABLET ORAL DAILY
Qty: 0 | Refills: 0 | Status: DISCONTINUED | OUTPATIENT
Start: 2017-08-07 | End: 2017-08-10

## 2017-08-07 RX ORDER — OLANZAPINE 15 MG/1
5 TABLET, FILM COATED ORAL DAILY
Qty: 0 | Refills: 0 | Status: DISCONTINUED | OUTPATIENT
Start: 2017-08-07 | End: 2017-08-10

## 2017-08-07 RX ORDER — ENOXAPARIN SODIUM 100 MG/ML
40 INJECTION SUBCUTANEOUS EVERY 24 HOURS
Qty: 0 | Refills: 0 | Status: DISCONTINUED | OUTPATIENT
Start: 2017-08-07 | End: 2017-08-10

## 2017-08-07 RX ORDER — BUPROPION HYDROCHLORIDE 150 MG/1
75 TABLET, EXTENDED RELEASE ORAL
Qty: 0 | Refills: 0 | Status: DISCONTINUED | OUTPATIENT
Start: 2017-08-07 | End: 2017-08-10

## 2017-08-07 RX ADMIN — Medication 81 MILLIGRAM(S): at 13:02

## 2017-08-07 RX ADMIN — BUPROPION HYDROCHLORIDE 75 MILLIGRAM(S): 150 TABLET, EXTENDED RELEASE ORAL at 18:14

## 2017-08-07 RX ADMIN — SODIUM CHLORIDE 75 MILLILITER(S): 9 INJECTION INTRAMUSCULAR; INTRAVENOUS; SUBCUTANEOUS at 06:16

## 2017-08-07 RX ADMIN — ENOXAPARIN SODIUM 40 MILLIGRAM(S): 100 INJECTION SUBCUTANEOUS at 21:35

## 2017-08-07 RX ADMIN — ATORVASTATIN CALCIUM 80 MILLIGRAM(S): 80 TABLET, FILM COATED ORAL at 21:35

## 2017-08-07 RX ADMIN — OLANZAPINE 20 MILLIGRAM(S): 15 TABLET, FILM COATED ORAL at 21:36

## 2017-08-07 NOTE — SWALLOW VFSS/MBS ASSESSMENT ADULT - ASPIRATION AFTER SWALLOW - COUGH
upon reinitiation of fluoro, trace amount of material noted along the anterior portion of the trachea./Trace

## 2017-08-07 NOTE — PHYSICAL THERAPY INITIAL EVALUATION ADULT - PERTINENT HX OF CURRENT PROBLEM, REHAB EVAL
Pt at assisted living facility, witnessed onset of L sided facial droop, slurred speech & R sided hemiparesis at 9:00am. pt taken to [facility], initial NIHSS of 9. received TPA bolus & completed full dose of TPa at 11AM. BP greater than 180 systolic, received Labetalol. transferred to [facility] for further workup. On physical exam, no driftX4 and 5/5 strength throughout. R sided hemiparesis resolved. However, productive aphasia & severe dysarthria NIHSS-8 MRS-4

## 2017-08-07 NOTE — OCCUPATIONAL THERAPY INITIAL EVALUATION ADULT - PERTINENT HX OF CURRENT PROBLEM, REHAB EVAL
67 year old male with a phx of schizophrenia, BPH, and HTN. Pt was at assisted living facility this morning and had witnessed onset of facial droop, slurred speech and right sided hemiparesis at 9:00am. He was then taken to [facility] and found to have initial NIHSS of 9. CT head officially reported at 9:51 and he received a TPA bolus and completed full dose of TPa at 11AM.

## 2017-08-07 NOTE — PROGRESS NOTE ADULT - ASSESSMENT
ASSESSMENT: Mr. Hernandez is a 66 y/o man with vascular risk factors of age and HTN who presented with facial droop, right side weakness and inability to produce speech. Evaluated at [facility] and found with NIHSS=9, last know well 9am. Patient received tPa and transfer to [facility] for possible endovascular intervention. Upon evaluation by neurology services at [facility] noted improvement of hemiparesis with residual expressive aphasia NIHSS=8. CT, CTA head and neck performed and no area of major vessel occlusion. Patient not candidate for endovascular given absence of large vessel occlusion. Admitted to stroke unit s/p tPA.  Neuroimaging reviewed and head CT with no area of acute intracranial pathology. CTA head and neck to me showed no evidence of large vessel occlusion, neither significant areas of stenosis intra or extracranially.  Given symptoms and neurological exam pertinent for expressive aphasia suspected LMCA territory dysfunction.     Impression: Cerebral embolism with cerebral infarction. L ICA distribution stroke - likely etiology being embolic stroke from a proximal source like cardiac/paradoxical source of embolism versus embolism from a possible hypercoagulable state.    NEURO: Continue close monitoring for neurologic deterioration, permissive HTN with slow titration to normotension, titrate statin to LDL goal less than 70, MRI Brain w/o, MRA Head w/o and Neck w/contrast. Physical therapy/OT/Speech eval/treatment.     ANTITHROMBOTIC THERAPY: ASA    PULMONARY: protecting airway, saturating well     CARDIOVASCULAR: check TTE/XOCHILT if negative plan ICM to assess for occult arrhythmia like Afib, cardiac monitoring without events                              SBP goal: normotension    GASTROINTESTINAL: Ulcer prophylaxis, dysphagia screen       Diet: NPO    RENAL: BUN/Cr without acute change, good urine output      Na Goal: Greater than 135     Combs:  no    HEMATOLOGY: H/H without acute change, Platelets 188, no active bleeding      DVT ppx:  LMWH      ID: afebrile, no leukocytosis     OTHER:     DISPOSITION: Rehab or home depending on PT eval once stable and workup is complete     PT/OT:     PMR:     Swallow:     CORE MEASURES:        Admission NIHSS: 8     TPA: YES      LDL/HDL: 83/50     Depression Screen: p     Statin Therapy: y     Dysphagia Screen:  FAIL     Smoking  NO      Afib  NO     Stroke Education YES ASSESSMENT: Mr. Hernandez is a 68 y/o man with vascular risk factors of age and HTN who presented with facial droop, right side weakness and inability to produce speech. Evaluated at [facility] and found with NIHSS=9, last know well 9am. Patient received tPa and transfer to [facility] for possible endovascular intervention. Upon evaluation by neurology services at [facility] noted improvement of hemiparesis with residual expressive aphasia NIHSS=8. CT, CTA head and neck performed and no area of major vessel occlusion. Patient not candidate for endovascular given absence of large vessel occlusion. Admitted to stroke unit s/p tPA.  Neuroimaging reviewed and head CT with no area of acute intracranial pathology. CTA head and neck to me showed no evidence of large vessel occlusion, neither significant areas of stenosis intra or extracranially.  Given symptoms and neurological exam pertinent for expressive aphasia suspected LMCA territory dysfunction.     Impression: Cerebral embolism with cerebral infarction. L ICA distribution stroke - likely etiology being embolic stroke from a proximal source like cardiac/paradoxical source of embolism versus embolism from a possible hypercoagulable state.    NEURO: No acute change in neuro exam, continue close monitoring for neurologic deterioration, permissive HTN with slow titration to normotension, titrate statin to LDL goal less than 70, MRI Brain w/o, MRA Head w/o and Neck w/contrast as noted above. Physical therapy/OT recommends home, will need SLP.     ANTITHROMBOTIC THERAPY: ASA    PULMONARY: protecting airway, saturating well     CARDIOVASCULAR: pending TTE/XOCHILT if negative plan ICM to assess for occult arrhythmia like Afib, cardiac monitoring without events                              SBP goal: normotension    GASTROINTESTINAL: Ulcer prophylaxis, dysphagia screen failed, [facility] 8/7 recommending D1 nectar with assistance, will need repeat evaluation as outpatient      Diet: D1 nectar    RENAL: BUN/Cr without acute change, good urine output      Na Goal: Greater than 135     Combs:  no    HEMATOLOGY: H/H without acute change, Platelets 188, no active bleeding      DVT ppx:  LMWH      ID: afebrile, no leukocytosis     OTHER: Appreciate psych consult, patient with history of Schizoprenia, recommend resuming meds once have po access.  Unable to assess for depression due to his inability to converse at this time.    DISPOSITION: Home as per PT eval once stable and workup is complete     CORE MEASURES:        Admission NIHSS: 8     TPA: YES      LDL/HDL: 83/50     Depression Screen: NA     Statin Therapy: y     Dysphagia Screen:  FAIL     Smoking  NO      Afib  NO     Stroke Education YES ASSESSMENT:   68 Y/O man with vascular risk factors of age and HTN who presented with facial droop, right side weakness and language disturbance in form of expressive aphasia. He was treated with IV tPA at [facility] and found with NIHSS=9, last know well 9am. Patient received tPa and transfer to [facility] for possible endovascular intervention. Upon evaluation by neurology services at [facility] noted improvement of hemiparesis with residual expressive aphasia NIHSS=8. CT, CTA head and neck performed and no area of major vessel occlusion. Patient not candidate for endovascular given absence of large vessel occlusion. Admitted to stroke unit s/p tPA.  Neuroimaging reviewed and head CT with no area of acute intracranial pathology. CTA head and neck to me showed no evidence of large vessel occlusion, neither significant areas of stenosis intra or extracranially.  Given symptoms and neurological exam pertinent for expressive aphasia suspected LMCA territory dysfunction.     Impression: Cerebral embolism with cerebral infarction. L ICA distribution stroke - likely etiology being embolic stroke from a proximal source like cardiac/paradoxical source of embolism versus embolism from a possible hypercoagulable state.    NEURO: No acute change in neuro exam, continue close monitoring for neurologic deterioration, permissive HTN with slow titration to normotension, titrate statin to LDL goal less than 70, MRI Brain w/o, MRA Head w/o and Neck w/contrast as noted above. Physical therapy/OT recommends home, will need SLP.     ANTITHROMBOTIC THERAPY: ASA    PULMONARY: protecting airway, saturating well     CARDIOVASCULAR: pending TTE/XOCHILT if negative plan ICM to assess for occult arrhythmia like Afib, cardiac monitoring without events                              SBP goal: normotension    GASTROINTESTINAL: Ulcer prophylaxis, dysphagia screen failed, [facility] 8/7 recommending D1 nectar with assistance, will need repeat evaluation as outpatient      Diet: D1 nectar    RENAL: BUN/Cr without acute change, good urine output      Na Goal: Greater than 135     Combs:  no    HEMATOLOGY: H/H without acute change, Platelets 188, no active bleeding      DVT ppx:  LMWH      ID: afebrile, no leukocytosis     OTHER: Appreciate psych consult, patient with history of Schizoprenia, recommend resuming meds once have po access.  Unable to assess for depression due to his inability to converse at this time.    DISPOSITION: Home as per PT eval once stable and workup is complete     CORE MEASURES:        Admission NIHSS: 8     TPA: YES      LDL/HDL: 83/50     Depression Screen: NA     Statin Therapy: y     Dysphagia Screen:  FAIL     Smoking  NO      Afib  NO     Stroke Education YES ASSESSMENT:   68 Y/O man with vascular risk factors of age and HTN presented to OSH with acute onset of right hemiparesis and language disturbance in form of expressive aphasia. He was treated with IV tPA at [facility] he was subsequently transferred to [facility] for further evaluation. CT brain on admission did not show any acute intracranial pathology and CTA head and neck did not show any symptomatic intracranial or extracranial large vessel severe stenosis or occlusion. MRI brain subsequently showed left MCA distribution embolic-looking stroke.    Impression:   Cerebral embolism with cerebral infarction. L ICA distribution stroke - likely etiology being embolic stroke from a proximal source like cardiac/paradoxical source of embolism versus embolism from a possible hypercoagulable state.    NEURO: No acute change in neuro exam, continue close monitoring for neurologic deterioration, permissive HTN for first 24 hours from symptom onset followed by gradual normotension, titrate statin to LDL goal less than 70, MRI Brain w/o as noted above. Physical therapy/OT recommends home, will need SLP.     ANTITHROMBOTIC THERAPY: ASA for secondary stroke prevention    PULMONARY: Protecting airway, saturating well     CARDIOVASCULAR: Pending XOCHILT with bubble study to rule out any structural cardiac cause of embolism. Continue with cardiac monitoring with telemetry for now and would likely benefit from prolonged cardiac monitoring which ICM to screen for occult cardiac arrhythmias like paroxysmal atrial fibrillation being the etiology of his cardioembolism     SBP goal: Gradual normotension    GASTROINTESTINAL: Ulcer prophylaxis, dysphagia screen failed, [facility] [place] 8/7 recommending D1 nectar with assistance, will need repeat evaluation as outpatient      Diet: D1 nectar    RENAL: BUN/Cr without acute change, good urine output      Na Goal: Greater than 135     Combs:  N    HEMATOLOGY: H/H without acute change, Platelets 188, no active bleeding      DVT ppx:  LMWH      ID: Afebrile, no leukocytosis     OTHER: Appreciate psych consult, patient with history of Schizoprenia, recommend resuming meds once have po access.  Unable to assess for depression due to his inability to converse at this time.    DISPOSITION: Home as per PT eval once stable and workup is complete. Plan was discussed with patient and his brother at bedside in detail. All the questions were answered and concerns were addressed.     CORE MEASURES:        Admission NIHSS: 8     TPA: YES      LDL/HDL: 83/50     Depression Screen: NA     Statin Therapy: y     Dysphagia Screen:  FAIL     Smoking  NO      Afib  NO     Stroke Education YES

## 2017-08-07 NOTE — SPEECH LANGUAGE PATHOLOGY EVALUATION - SLP OBSERVATIONS
unable to initiate/improving ability to initiate phonation for vowel sounds since bedside swallow evaluation/slurred speech

## 2017-08-07 NOTE — SPEECH LANGUAGE PATHOLOGY EVALUATION - SLP PATIENT/FAMILY GOALS STATEMENT
Patient's brother reports + change in expressive communication since admission. He endorses patient spoke in "short sentences" prior to hospitalization.

## 2017-08-07 NOTE — OCCUPATIONAL THERAPY INITIAL EVALUATION ADULT - ADDITIONAL COMMENTS
On physical exam at the time of presentation pt had no drift X 4 and 5/5 strength throughout. Seemingly the right sided hemiparesis resolved. However he still had productive aphasia and has severe dysarthria. CTH: Limited study due to motion with foci of white matter decreased attenuation likely microvascular disease without hemorrhage or midline shift. CT Angio: Unchanged mild volume loss with nonspecific foci of decreased attenuation in the white matter likely microvascular disease slightly more pronounced within the left subinsular cortex, lentiform nuclei and left frontal white matter new foci of ischemic change are not excluded, there is no acute hemorrhage or midline shift. DWI MR is more sensitive for new ischemic change and may be obtained as clinically warranted.

## 2017-08-07 NOTE — SWALLOW VFSS/MBS ASSESSMENT ADULT - NS SWALLOW VFSS REC ASPIR MON
fever/pneumonia/upper respiratory infection/throat clearing/Monitor for s/s aspiration/laryngeal penetration. If noted:  D/C p.o. intake, provide non-oral nutrition/hydration/meds, and contact this service @ [phone]/change of breathing pattern/cough/gurgly voice

## 2017-08-07 NOTE — PROGRESS NOTE ADULT - SUBJECTIVE AND OBJECTIVE BOX
THE PATIENT WAS SEEN AND EXAMINED BY ME WITH THE HOUSESTAFF AND STROKE TEAM DURING MORNING ROUNDS.   HPI:  Pt is a 67 year old male with a phx of schizophrenia, BPH, and HTN. Pt was at assisted living facility this morning and had witnessed onset of left sided facial droop, slurred speech and right sided hemiparesis at 9:00am. He was then taken to [facility] and found to have initial NIHSS of 9. CT head officially reported at 9:51 and he received a TPA bolus and completed full dose of TPa at 11AM. Pt has having BP greater than 180 systolic therefore received Labetalol 10mg ivp x 2. He was transferred to [facility] for further workup. On physical exam at the time of presentation pt had no drift X 4 and 5/5 strength throughout. Seemingly the right sided hemiparesis resolved. However he still had productive aphasia and has severe dysarthria. NIHSS - 8  MRS - 4    SUBJECTIVE: No events overnight.  No new neurologic complaints.      labetalol Injectable 10 milliGRAM(s) IV Push every 10 minutes PRN  pantoprazole    Tablet 40 milliGRAM(s) Oral before breakfast  atorvastatin 80 milliGRAM(s) Oral at bedtime  sodium chloride 0.9%. 1000 milliLiter(s) IV Continuous <Continuous>  aspirin Suppository 300 milliGRAM(s) Rectal daily      PHYSICAL EXAM:   Vital Signs Last 24 Hrs  T(C): 36.9 (06 Aug 2017 20:00), Max: 36.9 (06 Aug 2017 20:00)  T(F): 98.5 (06 Aug 2017 20:00), Max: 98.5 (06 Aug 2017 20:00)  HR: 97 (07 Aug 2017 06:00) (92 - 103)  BP: 145/100 (07 Aug 2017 06:00) (132/101 - 169/105)  BP(mean): 114 (07 Aug 2017 06:00) (104 - 123)  RR: 19 (07 Aug 2017 06:00) (13 - 20)  SpO2: 95% (07 Aug 2017 06:00) (95% - 97%)    General: No acute distress  HEENT: EOM intact, visual fields full  Abdomen: Soft, nontender, nondistended   Extremities: No edema    NEUROLOGICAL EXAM:  Mental status: Awake, alert, oriented x3, no aphasia, no neglect, normal memory   Cranial Nerves: No facial asymmetry, no nystagmus, no dysarthria, no dysphagia, tongue midline, shoulder shrug intact bilaterally.  Motor exam: Normal tone, no drift, 5/5 RUE, 5/5 RLE, 5/5 LUE, 5/5 LLE, normal fine finger movements.  Sensation: Intact to light touch   Coordination/ Gait: No dysmetria, AKBAR intact and symmetric bilaterally, on bedrest     LABS:                        13.9   8.5   )-----------( 188      ( 07 Aug 2017 04:46 )             40.6    08-07    138  |  105  |  15  ----------------------------<  75  4.1   |  19<L>  |  0.68    Ca    8.0<L>      07 Aug 2017 04:46    TPro  7.1  /  Alb  3.8  /  TBili  0.6  /  DBili  x   /  AST  15  /  ALT  25  /  AlkPhos  73  08-05  PT/INR - ( 05 Aug 2017 09:54 )   PT: 11.8 sec;   INR: 1.08 ratio         PTT - ( 05 Aug 2017 09:54 )  PTT:30.7 sec  Hemoglobin A1C, Whole Blood: 5.4 % (08-06 @ 09:02)      IMAGING: Reviewed by me.   CT Head w/o Cont (08.25.12 @ 13:49) >  IMPRESSION:  No acute intracranial hemorrhage.    CT Brain Stroke Protocol (08.05.17 @ 09:47) >  No acute intracranial hemorrhage, mass effect, or midline shift.     CT Head No Cont (08.06.17 @ 15:39) >  Limited study due to motion with foci of white matter decreased   attenuation likely microvascular disease without hemorrhage or midline   shift, suggest repeat imaging when patient can hold still, or MRI if   there are no MRI contraindications. There is no acute hemorrhage or   midline shift. THE PATIENT WAS SEEN AND EXAMINED BY ME WITH THE HOUSESTAFF AND STROKE TEAM DURING MORNING ROUNDS.   HPI:  Pt is a 67 year old male with a phx of schizophrenia, BPH, and HTN. Pt was at assisted living facility this morning and had witnessed onset of left sided facial droop, slurred speech and right sided hemiparesis at 9:00am. He was then taken to [facility] and found to have initial NIHSS of 9. CT head officially reported at 9:51 and he received a TPA bolus and completed full dose of TPa at 11AM. Pt has having BP greater than 180 systolic therefore received Labetalol 10mg ivp x 2. He was transferred to [facility] for further workup. On physical exam at the time of presentation pt had no drift X 4 and 5/5 strength throughout. Seemingly the right sided hemiparesis resolved. However he still had productive aphasia and has severe dysarthria. NIHSS - 8  MRS - 4    SUBJECTIVE: No events overnight.  No new neurologic complaints.      labetalol Injectable 10 milliGRAM(s) IV Push every 10 minutes PRN  pantoprazole    Tablet 40 milliGRAM(s) Oral before breakfast  atorvastatin 80 milliGRAM(s) Oral at bedtime  sodium chloride 0.9%. 1000 milliLiter(s) IV Continuous <Continuous>  aspirin Suppository 300 milliGRAM(s) Rectal daily      PHYSICAL EXAM:   Vital Signs Last 24 Hrs  T(C): 36.9 (06 Aug 2017 20:00), Max: 36.9 (06 Aug 2017 20:00)  T(F): 98.5 (06 Aug 2017 20:00), Max: 98.5 (06 Aug 2017 20:00)  HR: 97 (07 Aug 2017 06:00) (92 - 103)  BP: 145/100 (07 Aug 2017 06:00) (132/101 - 169/105)  BP(mean): 114 (07 Aug 2017 06:00) (104 - 123)  RR: 19 (07 Aug 2017 06:00) (13 - 20)  SpO2: 95% (07 Aug 2017 06:00) (95% - 97%)    General: No acute distress  HEENT: EOM intact, visual fields full  Abdomen: Soft, nontender, nondistended   Extremities: No edema    NEUROLOGICAL EXAM:  Mental status: Awake, alert, oriented x3 (based on yes/no answers), mute generated a few groan like noises, no neglect, follows all commands, is able to write   Cranial Nerves: Mild right facial palsy, no nystagmus, tongue midline, shoulder shrug intact bilaterally.  Motor exam: Normal tone, no drift, 5/5 RUE, 5/5 RLE, 5/5 LUE, 5/5 LLE, normal fine finger movements.  Sensation: Intact to light touch   Coordination/ Gait: No dysmetria, gait not assessed     LABS:                        13.9   8.5   )-----------( 188      ( 07 Aug 2017 04:46 )             40.6    08-07    138  |  105  |  15  ----------------------------<  75  4.1   |  19<L>  |  0.68    Ca    8.0<L>      07 Aug 2017 04:46    TPro  7.1  /  Alb  3.8  /  TBili  0.6  /  DBili  x   /  AST  15  /  ALT  25  /  AlkPhos  73  08-05  PT/INR - ( 05 Aug 2017 09:54 )   PT: 11.8 sec;   INR: 1.08 ratio         PTT - ( 05 Aug 2017 09:54 )  PTT:30.7 sec  Hemoglobin A1C, Whole Blood: 5.4 % (08-06 @ 09:02)      IMAGING: Reviewed by me.   CT Head w/o Cont (08.25.12 @ 13:49) >  IMPRESSION:  No acute intracranial hemorrhage.    CT Brain Stroke Protocol (08.05.17 @ 09:47) >  No acute intracranial hemorrhage, mass effect, or midline shift.     CT Head No Cont (08.06.17 @ 15:39) >  Limited study due to motion with foci of white matter decreased   attenuation likely microvascular disease without hemorrhage or midline   shift, suggest repeat imaging when patient can hold still, or MRI if   there are no MRI contraindications. There is no acute hemorrhage or   midline shift.      MRI Head w/o Cont (08.06.17 @ 17:31) >  Small acute infarct left frontal temporal regionand in the distal left   middle cerebral artery territory with positive diffusion, no hemorrhage. THE PATIENT WAS SEEN AND EXAMINED BY ME WITH THE HOUSESTAFF AND STROKE TEAM DURING MORNING ROUNDS.     HPI:  Pt is a 67 year old male with a phx of schizophrenia, BPH, and HTN. Pt was at assisted living facility this morning and had witnessed onset of left sided facial droop, slurred speech and right sided hemiparesis at 9:00am. He was then taken to [facility] and found to have initial NIHSS of 9. CT head officially reported at 9:51 and he received a TPA bolus and completed full dose of TPa at 11AM. Pt has having BP greater than 180 systolic therefore received Labetalol 10mg ivp x 2. He was transferred to [facility] for further workup. On physical exam at the time of presentation pt had no drift X 4 and 5/5 strength throughout. Seemingly the right sided hemiparesis resolved. However he still had productive aphasia and has severe dysarthria. NIHSS - 8  MRS - 4    SUBJECTIVE: No events overnight.  No new neurologic complaints.      labetalol Injectable 10 milliGRAM(s) IV Push every 10 minutes PRN  pantoprazole    Tablet 40 milliGRAM(s) Oral before breakfast  atorvastatin 80 milliGRAM(s) Oral at bedtime  sodium chloride 0.9%. 1000 milliLiter(s) IV Continuous <Continuous>  aspirin Suppository 300 milliGRAM(s) Rectal daily      PHYSICAL EXAM:   Vital Signs Last 24 Hrs  T(C): 36.9 (06 Aug 2017 20:00), Max: 36.9 (06 Aug 2017 20:00)  T(F): 98.5 (06 Aug 2017 20:00), Max: 98.5 (06 Aug 2017 20:00)  HR: 97 (07 Aug 2017 06:00) (92 - 103)  BP: 145/100 (07 Aug 2017 06:00) (132/101 - 169/105)  BP(mean): 114 (07 Aug 2017 06:00) (104 - 123)  RR: 19 (07 Aug 2017 06:00) (13 - 20)  SpO2: 95% (07 Aug 2017 06:00) (95% - 97%)    General: No acute distress  HEENT: EOM intact, visual fields full  Abdomen: Soft, nontender, nondistended   Extremities: No edema    NEUROLOGICAL EXAM:  Mental status: Awake, alert, oriented x3 (based on yes/no answers), mute generated a few groan like noises, no neglect, follows all commands, is able to write   Cranial Nerves: Mild right facial palsy, no nystagmus, tongue midline, shoulder shrug intact bilaterally.  Motor exam: Normal tone, no drift, 5/5 RUE, 5/5 RLE, 5/5 LUE, 5/5 LLE, normal fine finger movements.  Sensation: Intact to light touch   Coordination/ Gait: No dysmetria, gait not assessed     LABS:                        13.9   8.5   )-----------( 188      ( 07 Aug 2017 04:46 )             40.6    08-07    138  |  105  |  15  ----------------------------<  75  4.1   |  19<L>  |  0.68    Ca    8.0<L>      07 Aug 2017 04:46    TPro  7.1  /  Alb  3.8  /  TBili  0.6  /  DBili  x   /  AST  15  /  ALT  25  /  AlkPhos  73  08-05  PT/INR - ( 05 Aug 2017 09:54 )   PT: 11.8 sec;   INR: 1.08 ratio         PTT - ( 05 Aug 2017 09:54 )  PTT:30.7 sec  Hemoglobin A1C, Whole Blood: 5.4 % (08-06 @ 09:02)      IMAGING: Reviewed by me.   CT Head w/o Cont (08.25.12 @ 13:49) >  IMPRESSION:  No acute intracranial hemorrhage.    CT Brain Stroke Protocol (08.05.17 @ 09:47) >  No acute intracranial hemorrhage, mass effect, or midline shift.     CT Head No Cont (08.06.17 @ 15:39) >  Limited study due to motion with foci of white matter decreased   attenuation likely microvascular disease without hemorrhage or midline   shift, suggest repeat imaging when patient can hold still, or MRI if   there are no MRI contraindications. There is no acute hemorrhage or   midline shift.      MRI Head w/o Cont (08.06.17 @ 17:31) >  Small acute infarct left frontal temporal regionand in the distal left   middle cerebral artery territory with positive diffusion, no hemorrhage.

## 2017-08-07 NOTE — PROGRESS NOTE BEHAVIORAL HEALTH - SUMMARY
67M with PPHx schizophrenia per chart review, PMH significant for BPH, and HTN, [facility] with left sided facial droop, slurred speech and right sided hemiparesis, found to have new stroke, seen for mutism. Pt no longer mute, but having slurred speech due to stroke, no acute psychotic symptoms, off all his psych meds.

## 2017-08-07 NOTE — SWALLOW VFSS/MBS ASSESSMENT ADULT - ADDITIONAL RECOMMENDATIONS
Recommend speech therapy services post discharge re: restorative swallow therapy. Recommend repeat MBS prior to upgrade in diet to determine safety of diet advancement.

## 2017-08-07 NOTE — SWALLOW VFSS/MBS ASSESSMENT ADULT - SLP PERTINENT HISTORY OF CURRENT PROBLEM
Pt is a 67 year old male with a phx of schizophrenia, BPH, and HTN. Pt was at assisted living facility this morning and had witnessed onset of left sided facial droop, slurred speech and right sided hemiparesis at 9:00am. He was then taken to [facility] and found to have initial NIHSS of 9. CT head officially reported at 9:51 and he received a TPA bolus and completed full dose of TPa at 11AM. Pt has having BP greater than 180 systolic therefore received Labetalol 10mg ivp x 2. He was transferred to [facility] for further workup. On physical exam at the time of presentation pt had no drift X 4 and 5/5 strength throughout. Seemingly the right sided hemiparesis resolved. However he still had productive aphasia and has severe dysarthria.

## 2017-08-07 NOTE — SPEECH LANGUAGE PATHOLOGY EVALUATION - COMMENTS
NIHSS - 8  MRS - 4  8/5: CTH and CTA head and neck: Unchanged mild volume loss with nonspecific foci of decreased attenuation   in the white matter likely microvascular disease slightly more pronounced   within the left subinsular cortex, lentiform nuclei and left frontal   white matter new foci of ischemic change are not excluded, there is no   acute hemorrhage or midline shift. DWI MR is more sensitive for new   ischemic change and may be obtained as clinically warranted.    CTA demonstrates atherosclerotic vascular opacification of the left   petrous and bilateral internal carotid arteries, there is a punctate   calcification within the left frontal lobe, there is a slight decrease in   size and number of the distal left MCA branches relative to the right   side without acute hemorrhage or midline shift.    MRI Head ordered-> exam pending at time of this evaluation. 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 will need formal testing once speech production improves not tested at this time pt produces occasional vowel sounds (/a/ /e/ /o/) and grunting sounds.   1-syllabe words noted spontaneously on x2 occasions.

## 2017-08-07 NOTE — PHYSICAL THERAPY INITIAL EVALUATION ADULT - ADDITIONAL COMMENTS
CT angio neck/head & brain [facility] protocol 8/5/17: Unchanged mild volume loss with nonspecific foci of decreased attenuation in the white matter likely microvascular disease slightly more pronounced within the L subinsular cortex, lentiform nuclei & L frontal white matter new foci of ischemic change are not excluded, there is no acute hemorrhage or midline shift. DWI MR is more sensitive for new ischemic change and may be obtained as clinically warranted.CTA demonstrates atherosclerotic vascular opacification of the L petrous & bilateral internal carotid arteries, there is a punctate calcification within the L frontal lobe, there is a slight decrease in size & number of the distal L MCA branches relative to the R side without acute hemorrhage or midline shift. MRI Head 8/6/17: Small acute infarct L frontal temporal region & in the distal L middle cerebral artery territory with positive diffusion, no hemorrhage. CTH 8/6/17:There is no acute hemorrhage or midline shift.

## 2017-08-07 NOTE — SPEECH LANGUAGE PATHOLOGY EVALUATION - SLP PERTINENT HISTORY OF CURRENT PROBLEM
Pt is a 67 year old male with a phx of schizophrenia, BPH, and HTN. Pt was at assisted living facility this morning and had witnessed onset of left sided facial droop, slurred speech and right sided hemiparesis at 9:00am. He was then taken to [facility] and found to have initial NIHSS of 9. CT head officially reported at 9:51 and he received a TPA bolus and completed full dose of TPa at 11AM. Pt has having BP greater than 180 systolic therefore received Labetalol 10mg ivp x 2. He was transferred to [facility] for further workup. On physical exam at the time of presentation pt had no drift X 4 and 5/5 strength throughout. Seemingly the right sided hemiparesis resolved. However he still had productive aphasia and has severe dysarthria.

## 2017-08-07 NOTE — PROGRESS NOTE BEHAVIORAL HEALTH - NSBHCHARTREVIEWVS_PSY_A_CORE FT
Vital Signs Last 24 Hrs  T(C): 37 (07 Aug 2017 08:00), Max: 37 (07 Aug 2017 08:00)  T(F): 98.6 (07 Aug 2017 08:00), Max: 98.6 (07 Aug 2017 08:00)  HR: 90 (07 Aug 2017 12:00) (90 - 103)  BP: 185/107 (07 Aug 2017 12:00) (132/101 - 185/107)  BP(mean): 130 (07 Aug 2017 12:00) (104 - 130)  RR: 16 (07 Aug 2017 12:00) (13 - 20)  SpO2: 100% (07 Aug 2017 12:00) (95% - 100%)

## 2017-08-07 NOTE — SWALLOW VFSS/MBS ASSESSMENT ADULT - SLP GENERAL OBSERVATIONS
Pt arrived in radiology secure in FENG chair. Awake and alert. Accompanied by brother. Pt is non-verbal, following directions for evaluation purposes. Pt is on room air. Pt arrived in radiology secure in FENG chair. Awake and alert. Accompanied by brother. Pt is non-verbal, following directions for evaluation purposes. Pt is on room air. + Pooling of secretions in anterior and lateral sulci of oral cavity, + drooling of secretions. + Awareness of reduced secretion management .

## 2017-08-07 NOTE — CONSULT NOTE ADULT - PROBLEM SELECTOR RECOMMENDATION 9
Will paln for TTE and XOCHILT to rule out intracardiac thrombus  If negative, will plan for Internal Loop recorder to rule out occult PAF

## 2017-08-07 NOTE — PROGRESS NOTE BEHAVIORAL HEALTH - NSBHCONSULTMEDS_PSY_A_CORE FT
1. restart psych meds- zyprexa 5mg po daily, 20mg po at night, wellbutrin regular formulation 75mg po bid (pt takes sr 150mg daily at home, but not able to be crushed). trazodone 50mg po qhs.   2. Check TSH, b12, folate, RPR, HIV

## 2017-08-07 NOTE — SWALLOW VFSS/MBS ASSESSMENT ADULT - RECOMMENDED CONSISTENCY
Dysphagia 1 and nectar thick liquids. Supervision with all po intake. Small, single cup sips, no straws, small bites of food, encourage repeat swallow after each bite of food/sip of liquid, medication in applesauce. Maintain good oral hygiene. Upright position during meals and 30 minutes post intake.

## 2017-08-07 NOTE — SWALLOW VFSS/MBS ASSESSMENT ADULT - MODE OF PRESENTATION
spoon/fed by clinician spoon/self fed/fed by clinician/cup fed by clinician/self fed/cup/spoon self fed/cup/spoon/fed by clinician

## 2017-08-07 NOTE — SWALLOW VFSS/MBS ASSESSMENT ADULT - ORAL PHASE
Uncontrolled bolus / spillover in hoa-pharynx/variable amounts of bolus spillover to the pharynx: trace-moderate spillover of material to the valleculae, trace laryngeal penetration over the tip of the epiglottis prior to the swallow, trace-mild oral cavity residue/Uncontrolled bolus / spillover in hypopharynx/Reduced anterior - posterior transport/Laryngeal penetration before swallow - silent/Delayed oral transit time/Incomplete tongue to palate contact Uncontrolled bolus / spillover in hoa-pharynx/Uncontrolled bolus / spillover in hypopharynx/on initial swallow minimal amount of material formed into cohesive bolus. variable amount of spillover to the pharynx ranging from moderate to the valleculae and trace/mild ot the pyriform sinus. trace/mild spillover over the tip of superior laryngeal surface of the epiglottis prior to the swallow/Reduced anterior - posterior transport/Residue in oral cavity/Incomplete tongue to palate contact Uncontrolled bolus / spillover in hoa-pharynx/trace laryngeal penetration over the tip of the epiglottis prior to the swallow (improved bolus control when Po administered via cup vs spoon)/Uncontrolled bolus / spillover in hypopharynx/Incomplete tongue to palate contact/Laryngeal penetration before swallow - silent Incomplete tongue to palate contact/Uncontrolled bolus / spillover in hypopharynx/Laryngeal penetration before swallow - silent/Uncontrolled bolus / spillover in hoa-pharynx/mild-moderate spillover to the valleculae and trace laryngeal penetration prior to the swallow along the tip of the epiglottis Uncontrolled bolus / spillover in hypopharynx/Incomplete tongue to palate contact/Uncontrolled bolus / spillover in hoa-pharynx/moderate spillover of material to the pyriform sinus

## 2017-08-07 NOTE — SWALLOW VFSS/MBS ASSESSMENT ADULT - LARYNGEAL PENETRATION DURING THE SWALLOW - SILENT
Trace/shallow with retrieval given PO trials via straw-> trace-mild laryngeal penetration deep in the laryngeal vestibule, + retrieval of material/Trace

## 2017-08-07 NOTE — SWALLOW VFSS/MBS ASSESSMENT ADULT - RECOMMENDED FEEDING/EATING TECHNIQUES
no straws/position upright (90 degrees)/small sips/bites/allow for swallow between intakes/oral hygiene/crush medication (when feasible)

## 2017-08-07 NOTE — CONSULT NOTE ADULT - SUBJECTIVE AND OBJECTIVE BOX
CHIEF COMPLAINT: CVA/Dysarthria     HISTORY OF PRESENT ILLNESS:  Pt is a 67 year old male with a phx of schizophrenia, BPH, and HTN. Pt was at assisted living facility this morning and had witnessed onset of left sided facial droop, slurred speech and right sided hemiparesis at 9:00am. He was then taken to [facility] and found to have initial NIHSS of 9. CT head officially reported at 9:51 and he received a TPA bolus and completed full dose of TPa at 11AM. Pt has having BP greater than 180 systolic therefore received Labetalol 10mg ivp x 2. He was transferred to [facility] for further workup. On physical exam at the time of presentation pt had no drift X 4 and 5/5 strength throughout. Seemingly the right sided hemiparesis resolved. However he still had productive aphasia and has severe dysarthria. NIHSS - 8  MRS - 4  Denies chest pain, palpitations or shortness of breath. No previous cardiac issues.      PAST MEDICAL & SURGICAL HISTORY:  Tachycardia  Developmental Anomaly  Asthma, Intrinsic  Chronic Latent Schizophrenia  Acute (Undifferentiated) Schizophrenia  No significant past surgical history          MEDICATIONS:  labetalol Injectable 10 milliGRAM(s) IV Push every 10 minutes PRN  enoxaparin Injectable 40 milliGRAM(s) SubCutaneous every 24 hours        aspirin Suppository 300 milliGRAM(s) Rectal daily    pantoprazole    Tablet 40 milliGRAM(s) Oral before breakfast    atorvastatin 80 milliGRAM(s) Oral at bedtime    sodium chloride 0.9%. 1000 milliLiter(s) IV Continuous <Continuous>      FAMILY HISTORY:      SOCIAL HISTORY:    [ ] Non-smoker  [ ] Smoker  [ ] Alcohol    Allergies    No Known Allergies    Intolerances    	    REVIEW OF SYSTEMS:  CONSTITUTIONAL: No fever, weight loss, or fatigue  EYES: No eye pain, visual disturbances, or discharge  ENMT:  No difficulty hearing, tinnitus, vertigo; No sinus or throat pain  NECK: No pain or stiffness  RESPIRATORY: No cough, wheezing, chills or hemoptysis; No Shortness of Breath  CARDIOVASCULAR: No chest pain, palpitations, passing out, dizziness, or leg swelling  GASTROINTESTINAL: No abdominal or epigastric pain. No nausea, vomiting, or hematemesis; No diarrhea or constipation. No melena or hematochezia.  GENITOURINARY: No dysuria, frequency, hematuria, or incontinence  NEUROLOGICAL: +dysarthria,   SKIN: No itching, burning, rashes, or lesions   LYMPH Nodes: No enlarged glands  ENDOCRINE: No heat or cold intolerance; No hair loss  MUSCULOSKELETAL: No joint pain or swelling; No muscle, back, or extremity pain  PSYCHIATRIC: No depression, anxiety, mood swings, or difficulty sleeping  HEME/LYMPH: No easy bruising, or bleeding gums  ALLERY AND IMMUNOLOGIC: No hives or eczema	    [ ] All others negative	  [ ] Unable to obtain    PHYSICAL EXAM:  T(C): 37 (08-07-17 @ 08:00), Max: 37 (08-07-17 @ 08:00)  HR: 95 (08-07-17 @ 11:36) (92 - 103)  BP: 161/94 (08-07-17 @ 08:00) (132/101 - 169/105)  RR: 15 (08-07-17 @ 11:36) (13 - 20)  SpO2: 99% (08-07-17 @ 11:36) (95% - 99%)  Wt(kg): --  I&O's Summary    06 Aug 2017 07:01  -  07 Aug 2017 07:00  --------------------------------------------------------  IN: 1650 mL / OUT: 850 mL / NET: 800 mL        Appearance: Normal	  HEENT:   Normal oral mucosa, PERRL, EOMI	  Lymphatic: No lymphadenopathy  Cardiovascular: Normal S1 S2, No JVD, No murmurs, No edema  Respiratory: Lungs clear to auscultation	  Psychiatry: A & O x 3, Mood & affect appropriate  Gastrointestinal:  Soft, Non-tender, + BS	  Skin: No rashes, No ecchymoses, No cyanosis	  Neurologic: Non-focal  Extremities: Normal range of motion, No clubbing, cyanosis or edema  Vascular: Peripheral pulses palpable 2+ bilaterally  NEUROLOGICAL EXAM:  Mental status: Awake, alert, oriented x3, no aphasia, no neglect, normal memory   Cranial Nerves: No facial asymmetry, no nystagmus, no dysarthria, no dysphagia, tongue midline, shoulder shrug intact bilaterally.  Motor exam: Normal tone, no drift, 5/5 RUE, 5/5 RLE, 5/5 LUE, 5/5 LLE, normal fine finger movements.  Sensation: Intact to light touch   Coordination/ Gait: No dysmetria, AKBAR intact and symmetric bilaterally, on bedrest         TELEMETRY: SR/PACs    	    ECG:  NSR, no acute ischemic stt changes 	      RADIOLOGY:  < from: MRI Head w/o Cont (08.06.17 @ 17:31) >  There is an acute infarct in the left frontal and anterior left temporal   lobe with hyperintense DWI/FLAIR signal and hypointensity on ADC map.  Additional foci of diffusion restriction are identified in the distal   left middle cerebral artery territory along the left frontal parietal   cortex.    There are prominent VR spaces. There is no hydrocephalus, hemorrhage or   midline shift. Flow voids are seen within the major intracranial vessels   consistent with their patency. Mild ventricular enlargement is likely due   to atrophy.    The visualized paranasal sinuses and mastoid air cells are clear.  The   orbits, sellar and suprasellar structures, and craniocervical junction   are unremarkable.    IMPRESSION:  Small acute infarct left frontal temporal regionand in the distal left   middle cerebral artery territory with positive diffusion, no hemorrhage.        OTHER: < from: CT Head No Cont (08.06.17 @ 15:39) >  Current study is severely limited by motion and technique. There is   redemonstration of mild positive ventricles and sulci with nonspecific   foci of white matter decreased attenuation likely microvascular disease   without any obvious acute intra-axial hemorrhage ormidline shift. The   pituitary and posterior fossa are unchanged from prior.     The calvarium is intact. The visualized intraorbital compartments,   paranasal sinuses and tympanomastoid cavities appear free of acute   disease.    IMPRESSION:    Limited study due to motion with foci of white matter decreased   attenuation likely microvascular disease without hemorrhage or midline   shift, suggest repeat imaging when patient can hold still, or MRI if   there are no MRI contraindications. There is no acute hemorrhage or   midline shift.    < from: CT Angio Neck w/ IV Cont (08.05.17 @ 12:01) >  Contiguous axial images from the skull base to the vertex and   reconstructed in multiple planes. After the intravenous power injection   of non-ionic contrast material, serial thin sections were obtained   through the intracranial circulationon a multislice CT scanner.  Images   were reformatted using the dedicated 3-D workstation package. 90 cc   Omnipaque 300 was injected intravenously with 10 cc discarded    CTA NECK:    After the intravenous power injection of non-ionic contrast material,   serial thin sections were obtained through the cervical circulation on a   multislice CT scanner.  Images were reformatted using the 3D 3-D   workstation software package.    COMPARISON: HEAD CT from [facility] dated 8/5/2017 at 9:41 AM.    FINDINGS:    HEAD CT:    There is redemonstration of mild enlargement of the ventricles and sulci   greater expected for age consistent with volume loss. There are multiple   nonspecific foci of decreased attenuation in the white matter matter   likely  microvascular disease, there is a small punctate calcification   within the left frontal lobe, axial series 2 image 19, in retrospect on   prior, series 4 image 40 possibly punctate calcification within a vessel.   There is slightly more pronounced decreased attenuation in the left   subinsular cortex and left lentiform nuclei, and left frontal white   matter axial series 2 image 12, in retrospect present on prior, axial   series 4 image 25 that with areas of possible age indeterminate ischemic   change. DWI MR may be more sensitive for new ischemic change. There is no   large extra-axial hemorrhage or midline shift, the basal cisterns remain   patent.    There is a partially empty sella, posterior fossa demonstrate volume   loss, thereis atherosclerotic vascular calcination along the carotid   siphons.    The orbital globes are unchanged, paranasal sinuses and mastoid air cells   remain unchanged and unremarkable, the calvarium is intact.    CTA NECK AND [place] OF [place]:    The aortic arch and proximal origin of the great vessels are   unremarkable, the bilateral common carotid arteries are unremarkable in   course and caliber, there is atherosclerotic plaque and vascular   calcification of the bilateral carotid bulbs without hemodynamically   significant stenosis, there is tortuosity of the bilateral internal   carotid arteries which may be seen with hypertension. There is   atherosclerotic calcification of the petrous internal carotid artery and   along the bilateral internal cavernous carotid arteries.    The bifurcation segments proximal A1 and M1 segments are unremarkable,   there is a slight decrease in size and number of the distal left MCA   branch vasculature relative to the right side, axial series 6 image 295.   The right vertebral artery is dominant, the left vertebral artery is   smaller although patent throughout its course, the vertebrals   unremarkable intradurally, there is a patent basilar artery, the proximal   P1 arterial segments are unremarkable, with a patent right posterior   communicating artery.    IMPRESSION:       Unchanged mild volume loss with nonspecific foci of decreased attenuation   in the white matter likely microvascular disease slightly more pronounced   within the left subinsular cortex, lentiform nuclei and left frontal   white matter new foci of ischemic change are not excluded, there is no   acute hemorrhage or midline shift. DWI MR is more sensitive for new   ischemic change and may be obtained as clinically warranted.    CTA demonstrates atherosclerotic vascular opacification of the left   petrous and bilateral internal carotid arteries, there is a punctate   calcification within the left frontal lobe, there is a slight decrease in   size and number of the distal left MCA branches relative to the right   side without acute hemorrhage or midline shift.        	  	  LABS:	 	    CARDIAC MARKERS:                                  13.9   8.5   )-----------( 188      ( 07 Aug 2017 04:46 )             40.6     08-07    138  |  105  |  15  ----------------------------<  75  4.1   |  19<L>  |  0.68    Ca    8.0<L>      07 Aug 2017 04:46      proBNP:   Lipid Profile:   HgA1c:   TSH:

## 2017-08-07 NOTE — SWALLOW VFSS/MBS ASSESSMENT ADULT - COMMENTS
· Comments	NIHSS - 8  MRS - 4  8/5: CTH and CTA head and neck: Unchanged mild volume loss with nonspecific foci of decreased attenuation   in the white matter likely microvascular disease slightly more pronounced   within the left subinsular cortex, lentiform nuclei and left frontal   white matter new foci of ischemic change are not excluded, there is no   acute hemorrhage or midline shift. DWI MR is more sensitive for new   ischemic change and may be obtained as clinically warranted.    CTA demonstrates atherosclerotic vascular opacification of the left   petrous and bilateral internal carotid arteries, there is a punctate   calcification within the left frontal lobe, there is a slight decrease in   size and number of the distal left MCA branches relative to the right   side without acute hemorrhage or midline shift.    MRI Head ordered-> exam pending at time of this evaluation. NIHSS - 8  MRS - 4  8/5: CTH and CTA head and neck: Unchanged mild volume loss with nonspecific foci of decreased attenuation   in the white matter likely microvascular disease slightly more pronounced   within the left subinsular cortex, lentiform nuclei and left frontal   white matter new foci of ischemic change are not excluded, there is no   acute hemorrhage or midline shift. DWI MR is more sensitive for new   ischemic change and may be obtained as clinically warranted.    CTA demonstrates atherosclerotic vascular opacification of the left   petrous and bilateral internal carotid arteries, there is a punctate   calcification within the left frontal lobe, there is a slight decrease in   size and number of the distal left MCA branches relative to the right   side without acute hemorrhage or midline shift.    MRI Head ordered-> exam pending at time of this evaluation.

## 2017-08-07 NOTE — SWALLOW VFSS/MBS ASSESSMENT ADULT - DIAGNOSTIC IMPRESSIONS
Pt presents with an oropharyngeal dysphagia characterized by reduced management of oral secretions, poor bolus formation/control, premature spillover of material to the pharynx on purees and all viscosities of liquids, delayed pharyngeal swallows and laryngeal penetration with retrieval of material on thin/thick puree and honey and nectar thick fluids. Suspected silent aspiration occurred on thin liquid via teaspoon off fluoro (upon reinitiation of fluoro trace amount of aspiration evident along the anterior portion of trachea).  Swallow disorders: reduced lingual strength/ROM/coordination, reduced base of tongue to posterior pharyngeal wall contact, delay in the trigger of the pharyngeal swallow, s/s of reduced supraglottic and subglottic sensation. Pt presents with an oropharyngeal dysphagia characterized by reduced management of oral secretions, poor bolus formation/control, premature spillover of material to the pharynx on purees and all viscosities of liquids, delayed pharyngeal swallows and laryngeal penetration with retrieval of material on thin/thick puree and honey and nectar thick fluids. Suspected aspiration occurred on thin liquid (upon reinitiation of fluoro trace amount of aspiration evident along the anterior portion of trachea). Soft solid and solid foods not administered 2/2 oral phase deficits.   Swallow disorders: reduced lingual strength/ROM/coordination, reduced base of tongue to posterior pharyngeal wall contact, delay in the trigger of the pharyngeal swallow, s/s of reduced supraglottic and subglottic sensation.

## 2017-08-07 NOTE — SWALLOW VFSS/MBS ASSESSMENT ADULT - ROSENBEK'S PENETRATION ASPIRATION SCALE
(2) contrast enters airway, remains above the vocal cords, no residue remains (penetration) (7) contrast passes glottis, visible subglottic residue remains despite patient’s response (aspiration)

## 2017-08-07 NOTE — PROGRESS NOTE BEHAVIORAL HEALTH - NSBHCHARTREVIEWLAB_PSY_A_CORE FT
13.9   8.5   )-----------( 188      ( 07 Aug 2017 04:46 )             40.6     08-07    138  |  105  |  15  ----------------------------<  75  4.1   |  19<L>  |  0.68    Ca    8.0<L>      07 Aug 2017 04:46

## 2017-08-07 NOTE — OCCUPATIONAL THERAPY INITIAL EVALUATION ADULT - LIVES WITH, PROFILE
pt lives in a group home and attends a day program 7 days a week, per brother at bedside pt is independent with ADLs and ambulation, at baseline pt is verbal but minimally

## 2017-08-08 LAB
ANION GAP SERPL CALC-SCNC: 14 MMOL/L — SIGNIFICANT CHANGE UP (ref 5–17)
BUN SERPL-MCNC: 13 MG/DL — SIGNIFICANT CHANGE UP (ref 7–23)
CALCIUM SERPL-MCNC: 8.3 MG/DL — LOW (ref 8.4–10.5)
CHLORIDE SERPL-SCNC: 103 MMOL/L — SIGNIFICANT CHANGE UP (ref 96–108)
CO2 SERPL-SCNC: 24 MMOL/L — SIGNIFICANT CHANGE UP (ref 22–31)
CREAT SERPL-MCNC: 0.76 MG/DL — SIGNIFICANT CHANGE UP (ref 0.5–1.3)
GLUCOSE SERPL-MCNC: 90 MG/DL — SIGNIFICANT CHANGE UP (ref 70–99)
HCT VFR BLD CALC: 41.7 % — SIGNIFICANT CHANGE UP (ref 39–50)
HGB BLD-MCNC: 14.6 G/DL — SIGNIFICANT CHANGE UP (ref 13–17)
MCHC RBC-ENTMCNC: 34 PG — SIGNIFICANT CHANGE UP (ref 27–34)
MCHC RBC-ENTMCNC: 35.1 GM/DL — SIGNIFICANT CHANGE UP (ref 32–36)
MCV RBC AUTO: 97 FL — SIGNIFICANT CHANGE UP (ref 80–100)
PLATELET # BLD AUTO: 194 K/UL — SIGNIFICANT CHANGE UP (ref 150–400)
POTASSIUM SERPL-MCNC: 3.8 MMOL/L — SIGNIFICANT CHANGE UP (ref 3.5–5.3)
POTASSIUM SERPL-SCNC: 3.8 MMOL/L — SIGNIFICANT CHANGE UP (ref 3.5–5.3)
RBC # BLD: 4.29 M/UL — SIGNIFICANT CHANGE UP (ref 4.2–5.8)
RBC # FLD: 11.1 % — SIGNIFICANT CHANGE UP (ref 10.3–14.5)
SODIUM SERPL-SCNC: 141 MMOL/L — SIGNIFICANT CHANGE UP (ref 135–145)
T PALLIDUM AB TITR SER: NEGATIVE — SIGNIFICANT CHANGE UP
VIT B12 SERPL-MCNC: 1030 PG/ML — HIGH (ref 243–894)
WBC # BLD: 9 K/UL — SIGNIFICANT CHANGE UP (ref 3.8–10.5)
WBC # FLD AUTO: 9 K/UL — SIGNIFICANT CHANGE UP (ref 3.8–10.5)

## 2017-08-08 PROCEDURE — 99223 1ST HOSP IP/OBS HIGH 75: CPT | Mod: AI

## 2017-08-08 PROCEDURE — 93312 ECHO TRANSESOPHAGEAL: CPT | Mod: 26

## 2017-08-08 PROCEDURE — 99233 SBSQ HOSP IP/OBS HIGH 50: CPT

## 2017-08-08 PROCEDURE — 93306 TTE W/DOPPLER COMPLETE: CPT | Mod: 26

## 2017-08-08 RX ADMIN — OLANZAPINE 20 MILLIGRAM(S): 15 TABLET, FILM COATED ORAL at 23:04

## 2017-08-08 RX ADMIN — Medication 10 MILLIGRAM(S): at 14:32

## 2017-08-08 RX ADMIN — ENOXAPARIN SODIUM 40 MILLIGRAM(S): 100 INJECTION SUBCUTANEOUS at 23:04

## 2017-08-08 RX ADMIN — OLANZAPINE 5 MILLIGRAM(S): 15 TABLET, FILM COATED ORAL at 18:09

## 2017-08-08 RX ADMIN — BUPROPION HYDROCHLORIDE 75 MILLIGRAM(S): 150 TABLET, EXTENDED RELEASE ORAL at 05:47

## 2017-08-08 RX ADMIN — ATORVASTATIN CALCIUM 80 MILLIGRAM(S): 80 TABLET, FILM COATED ORAL at 23:04

## 2017-08-08 RX ADMIN — BUPROPION HYDROCHLORIDE 75 MILLIGRAM(S): 150 TABLET, EXTENDED RELEASE ORAL at 18:09

## 2017-08-08 RX ADMIN — Medication 81 MILLIGRAM(S): at 14:31

## 2017-08-08 NOTE — PROGRESS NOTE ADULT - SUBJECTIVE AND OBJECTIVE BOX
THE PATIENT WAS SEEN AND EXAMINED BY ME WITH THE HOUSESTAFF AND STROKE TEAM DURING MORNING ROUNDS.   HPI:  67 year old male with a phx of schizophrenia, BPH, and HTN. Pt was at assisted living facility this morning and had witnessed onset of left sided facial droop, slurred speech and right sided hemiparesis at 9:00am. He was then taken to [facility] and found to have initial NIHSS of 9. CT head officially reported at 9:51 and he received a TPA bolus and completed full dose of TPa at 11AM. Pt has having BP greater than 180 systolic therefore received Labetalol 10mg ivp x 2. He was transferred to [facility] for further workup. On physical exam at the time of presentation pt had no drift X 4 and 5/5 strength throughout. Seemingly the right sided hemiparesis resolved. However he still had productive aphasia and has severe dysarthria. NIHSS - 8  MRS - 4      SUBJECTIVE: No events overnight.  No new neurologic complaints.      labetalol Injectable 10 milliGRAM(s) IV Push every 10 minutes PRN  pantoprazole    Tablet 40 milliGRAM(s) Oral before breakfast  atorvastatin 80 milliGRAM(s) Oral at bedtime  sodium chloride 0.9%. 1000 milliLiter(s) IV Continuous <Continuous>  enoxaparin Injectable 40 milliGRAM(s) SubCutaneous every 24 hours  aspirin  chewable 81 milliGRAM(s) Oral daily  OLANZapine 5 milliGRAM(s) Oral daily  OLANZapine 20 milliGRAM(s) Oral at bedtime  buPROPion . 75 milliGRAM(s) Oral two times a day      PHYSICAL EXAM:   Vital Signs Last 24 Hrs  T(C): 36.3 (08 Aug 2017 07:34), Max: 37.1 (07 Aug 2017 20:00)  T(F): 97.4 (08 Aug 2017 07:34), Max: 98.8 (07 Aug 2017 20:00)  HR: 78 (08 Aug 2017 07:34) (74 - 108)  BP: 166/96 (08 Aug 2017 07:34) (100/68 - 185/107)  BP(mean): 107 (08 Aug 2017 02:00) (80 - 130)  RR: 18 (08 Aug 2017 07:34) (13 - 22)  SpO2: 97% (08 Aug 2017 07:34) (79% - 100%)    General: No acute distress  HEENT: EOM intact, visual fields full  Abdomen: Soft, nontender, nondistended   Extremities: No edema    NEUROLOGICAL EXAM:  Mental status: Awake, alert, oriented x3 (based on yes/no answers), mute generated a few groan like noises, no neglect, follows all commands, is able to write   Cranial Nerves: Mild right facial palsy, no nystagmus, tongue midline, shoulder shrug intact bilaterally.  Motor exam: Normal tone, no drift, 5/5 RUE, 5/5 RLE, 5/5 LUE, 5/5 LLE, normal fine finger movements.  Sensation: Intact to light touch   Coordination/ Gait: No dysmetria, gait not assessed     LABS:                        14.6   9.0   )-----------( 194      ( 08 Aug 2017 06:40 )             41.7    08-08    141  |  103  |  13  ----------------------------<  90  3.8   |  24  |  0.76    Ca    8.3<L>      08 Aug 2017 06:39      Hemoglobin A1C, Whole Blood: 5.4 % (08-06 @ 09:02)      IMAGING: Reviewed by me.     CT Head w/o Cont (08.25.12 @ 13:49) >  IMPRESSION:  No acute intracranial hemorrhage.  CT Brain Stroke Protocol (08.05.17 @ 09:47) >  No acute intracranial hemorrhage, mass effect, or midline shift.   CT Head No Cont (08.06.17 @ 15:39) >  Limited study due to motion with foci of white matter decreased   attenuation likely microvascular disease without hemorrhage or midline   shift, suggest repeat imaging when patient can hold still, or MRI if   there are no MRI contraindications. There is no acute hemorrhage or   midline shift.  MRI Head w/o Cont (08.06.17 @ 17:31) >  Small acute infarct left frontal temporal regionand in the distal left   middle cerebral artery territory with positive diffusion, no hemorrhage. THE PATIENT WAS SEEN AND EXAMINED BY ME WITH THE HOUSESTAFF AND STROKE TEAM DURING MORNING ROUNDS.   HPI:  67 year old male with a phx of schizophrenia, BPH, and HTN. Pt was at assisted living facility this morning and had witnessed onset of left sided facial droop, slurred speech and right sided hemiparesis at 9:00am. He was then taken to [facility] and found to have initial NIHSS of 9. CT head officially reported at 9:51 and he received a TPA bolus and completed full dose of TPa at 11AM. Pt has having BP greater than 180 systolic therefore received Labetalol 10mg ivp x 2. He was transferred to [facility] for further workup. On physical exam at the time of presentation pt had no drift X 4 and 5/5 strength throughout. Seemingly the right sided hemiparesis resolved. However he still had productive aphasia and has severe dysarthria. NIHSS - 8  MRS - 4      SUBJECTIVE: No events overnight.  No new neurologic complaints.      labetalol Injectable 10 milliGRAM(s) IV Push every 10 minutes PRN  pantoprazole    Tablet 40 milliGRAM(s) Oral before breakfast  atorvastatin 80 milliGRAM(s) Oral at bedtime  sodium chloride 0.9%. 1000 milliLiter(s) IV Continuous <Continuous>  enoxaparin Injectable 40 milliGRAM(s) SubCutaneous every 24 hours  aspirin  chewable 81 milliGRAM(s) Oral daily  OLANZapine 5 milliGRAM(s) Oral daily  OLANZapine 20 milliGRAM(s) Oral at bedtime  buPROPion . 75 milliGRAM(s) Oral two times a day      PHYSICAL EXAM:   Vital Signs Last 24 Hrs  T(C): 36.3 (08 Aug 2017 07:34), Max: 37.1 (07 Aug 2017 20:00)  T(F): 97.4 (08 Aug 2017 07:34), Max: 98.8 (07 Aug 2017 20:00)  HR: 78 (08 Aug 2017 07:34) (74 - 108)  BP: 166/96 (08 Aug 2017 07:34) (100/68 - 185/107)  BP(mean): 107 (08 Aug 2017 02:00) (80 - 130)  RR: 18 (08 Aug 2017 07:34) (13 - 22)  SpO2: 97% (08 Aug 2017 07:34) (79% - 100%)    General: No acute distress  HEENT: EOM intact, visual fields full  Abdomen: Soft, nontender, nondistended   Extremities: No edema    NEUROLOGICAL EXAM:  Mental status: Awake, alert, oriented x3 and able to ID objects (based on yes/no answers with choices), mute generated a few groan like noises, no neglect, follows all commands, is able to write   Cranial Nerves: Mild right facial palsy, no nystagmus, tongue midline, shoulder shrug intact bilaterally.  Motor exam: Normal tone, no drift, 5/5 RUE, 5/5 RLE, 5/5 LUE, 5/5 LLE, normal fine finger movements.  Sensation: Intact to light touch   Coordination/ Gait: No dysmetria, gait not assessed     LABS:                        14.6   9.0   )-----------( 194      ( 08 Aug 2017 06:40 )             41.7    08-08    141  |  103  |  13  ----------------------------<  90  3.8   |  24  |  0.76    Ca    8.3<L>      08 Aug 2017 06:39      Hemoglobin A1C, Whole Blood: 5.4 % (08-06 @ 09:02)      IMAGING: Reviewed by me.     CT Head w/o Cont (08.25.12 @ 13:49) >  IMPRESSION:  No acute intracranial hemorrhage.  CT Brain Stroke Protocol (08.05.17 @ 09:47) >  No acute intracranial hemorrhage, mass effect, or midline shift.   CT Head No Cont (08.06.17 @ 15:39) >  Limited study due to motion with foci of white matter decreased   attenuation likely microvascular disease without hemorrhage or midline   shift, suggest repeat imaging when patient can hold still, or MRI if   there are no MRI contraindications. There is no acute hemorrhage or   midline shift.  MRI Head w/o Cont (08.06.17 @ 17:31) >  Small acute infarct left frontal temporal regionand in the distal left   middle cerebral artery territory with positive diffusion, no hemorrhage. THE PATIENT WAS SEEN AND EXAMINED BY ME WITH THE HOUSESTAFF AND STROKE TEAM DURING MORNING ROUNDS.     HPI:  67 year old male with a phx of schizophrenia, BPH, and HTN. Pt was at assisted living facility this morning and had witnessed onset of left sided facial droop, slurred speech and right sided hemiparesis at 9:00am. He was then taken to [facility] and found to have initial NIHSS of 9. CT head officially reported at 9:51 and he received a TPA bolus and completed full dose of TPa at 11AM. Pt has having BP greater than 180 systolic therefore received Labetalol 10mg ivp x 2. He was transferred to [facility] for further workup. On physical exam at the time of presentation pt had no drift X 4 and 5/5 strength throughout. Seemingly the right sided hemiparesis resolved. However he still had productive aphasia and has severe dysarthria. NIHSS - 8  MRS - 4      SUBJECTIVE: No events overnight.  No new neurologic complaints.      labetalol Injectable 10 milliGRAM(s) IV Push every 10 minutes PRN  pantoprazole    Tablet 40 milliGRAM(s) Oral before breakfast  atorvastatin 80 milliGRAM(s) Oral at bedtime  sodium chloride 0.9%. 1000 milliLiter(s) IV Continuous <Continuous>  enoxaparin Injectable 40 milliGRAM(s) SubCutaneous every 24 hours  aspirin  chewable 81 milliGRAM(s) Oral daily  OLANZapine 5 milliGRAM(s) Oral daily  OLANZapine 20 milliGRAM(s) Oral at bedtime  buPROPion . 75 milliGRAM(s) Oral two times a day      PHYSICAL EXAM:   Vital Signs Last 24 Hrs  T(C): 36.3 (08 Aug 2017 07:34), Max: 37.1 (07 Aug 2017 20:00)  T(F): 97.4 (08 Aug 2017 07:34), Max: 98.8 (07 Aug 2017 20:00)  HR: 78 (08 Aug 2017 07:34) (74 - 108)  BP: 166/96 (08 Aug 2017 07:34) (100/68 - 185/107)  BP(mean): 107 (08 Aug 2017 02:00) (80 - 130)  RR: 18 (08 Aug 2017 07:34) (13 - 22)  SpO2: 97% (08 Aug 2017 07:34) (79% - 100%)    General: No acute distress  HEENT: EOM intact, visual fields full  Abdomen: Soft, nontender, nondistended   Extremities: No edema    NEUROLOGICAL EXAM:  Mental status: Awake, alert, oriented x3 and able to ID objects (based on yes/no answers with choices), mute generated a few groan like noises, no neglect, follows all commands, is able to write   Cranial Nerves: Mild right facial palsy, no nystagmus, tongue midline, shoulder shrug intact bilaterally.  Motor exam: Normal tone, no drift, 5/5 RUE, 5/5 RLE, 5/5 LUE, 5/5 LLE, normal fine finger movements.  Sensation: Intact to light touch   Coordination/ Gait: No dysmetria, gait not assessed     LABS:                        14.6   9.0   )-----------( 194      ( 08 Aug 2017 06:40 )             41.7    08-08    141  |  103  |  13  ----------------------------<  90  3.8   |  24  |  0.76    Ca    8.3<L>      08 Aug 2017 06:39      Hemoglobin A1C, Whole Blood: 5.4 % (08-06 @ 09:02)      IMAGING: Reviewed by me.     CT Head w/o Cont (08.25.12 @ 13:49) >  IMPRESSION:  No acute intracranial hemorrhage.  CT Brain Stroke Protocol (08.05.17 @ 09:47) >  No acute intracranial hemorrhage, mass effect, or midline shift.   CT Head No Cont (08.06.17 @ 15:39) >  Limited study due to motion with foci of white matter decreased   attenuation likely microvascular disease without hemorrhage or midline   shift, suggest repeat imaging when patient can hold still, or MRI if   there are no MRI contraindications. There is no acute hemorrhage or   midline shift.  MRI Head w/o Cont (08.06.17 @ 17:31) >  Small acute infarct left frontal temporal regionand in the distal left   middle cerebral artery territory with positive diffusion, no hemorrhage.

## 2017-08-08 NOTE — PROGRESS NOTE ADULT - SUBJECTIVE AND OBJECTIVE BOX
Subjective: Patient seen and examined. No new events except as noted.   resting comfortably in bed  remain in stroke unit     REVIEW OF SYSTEMS:    CONSTITUTIONAL: + weakness, fevers or chills  EYES/ENT: No visual changes;  No vertigo or throat pain   NECK: No pain or stiffness  RESPIRATORY: No cough, wheezing, hemoptysis; No shortness of breath  CARDIOVASCULAR: No chest pain or palpitations  GASTROINTESTINAL: No abdominal or epigastric pain. No nausea, vomiting, or hematemesis; No diarrhea or constipation. No melena or hematochezia.  GENITOURINARY: No dysuria, frequency or hematuria  NEUROLOGICAL: +dysarthria  SKIN: No itching, burning, rashes, or lesions   All other review of systems is negative unless indicated above.    MEDICATIONS:  MEDICATIONS  (STANDING):  pantoprazole    Tablet 40 milliGRAM(s) Oral before breakfast  atorvastatin 80 milliGRAM(s) Oral at bedtime  sodium chloride 0.9%. 1000 milliLiter(s) (75 mL/Hr) IV Continuous <Continuous>  enoxaparin Injectable 40 milliGRAM(s) SubCutaneous every 24 hours  aspirin  chewable 81 milliGRAM(s) Oral daily  OLANZapine 5 milliGRAM(s) Oral daily  OLANZapine 20 milliGRAM(s) Oral at bedtime  buPROPion . 75 milliGRAM(s) Oral two times a day      PHYSICAL EXAM:  T(C): 36.3 (08-08-17 @ 07:34), Max: 37.1 (08-07-17 @ 20:00)  HR: 78 (08-08-17 @ 07:34) (74 - 108)  BP: 166/96 (08-08-17 @ 07:34) (100/68 - 185/107)  RR: 18 (08-08-17 @ 07:34) (13 - 22)  SpO2: 97% (08-08-17 @ 07:34) (79% - 100%)  Wt(kg): --  I&O's Summary    07 Aug 2017 07:01  -  08 Aug 2017 07:00  --------------------------------------------------------  IN: 1425 mL / OUT: 1 mL / NET: 1424 mL          Appearance: Normal	  HEENT:   Normal oral mucosa, PERRL, EOMI	  Lymphatic: No lymphadenopathy , no edema  Cardiovascular: Normal S1 S2, No JVD, No murmurs , Peripheral pulses palpable 2+ bilaterally  Respiratory: Lungs clear to auscultation, normal effort 	  Gastrointestinal:  Soft, Non-tender, + BS	  Skin: No rashes, No ecchymoses, No cyanosis, warm to touch  Musculoskeletal: Normal range of motion, normal strength  Psychiatry:  Mood & affect appropriate  Ext: No edema  Neuro: +dysarthria     LABS:    CARDIAC MARKERS:  CARDIAC MARKERS ( 05 Aug 2017 09:54 )  .000 ng/mL / x     / 70 U/L / x     / 1.6 ng/mL                                14.6   9.0   )-----------( 194      ( 08 Aug 2017 06:40 )             41.7     08-08    141  |  103  |  13  ----------------------------<  90  3.8   |  24  |  0.76    Ca    8.3<L>      08 Aug 2017 06:39      proBNP:   Lipid Profile:   HgA1c:   TSH: Thyroid Stimulating Hormone, Serum: 0.93 uIU/mL (08-07 @ 16:00)            TELEMETRY: SR 	    ECG:  	  RADIOLOGY:   DIAGNOSTIC TESTING:  [ ] Echocardiogram:  [ ]  Catheterization:  [ ] Stress Test:    OTHER:

## 2017-08-08 NOTE — CONSULT NOTE ADULT - SUBJECTIVE AND OBJECTIVE BOX
Patient seen and evaluated @ [facility] cath recovery  Consult for loop recorder    HPI per record:  Pt is a 67 year old male with a phx of schizophrenia, BPH, and HTN. Pt was at assisted living facility this morning and had witnessed onset of left sided facial droop, slurred speech and right sided hemiparesis at 9:00am. He was then taken to [facility] and found to have initial NIHSS of 9. CT head officially reported at 9:51 and he received a TPA bolus and completed full dose of TPa at 11AM. Pt has having BP greater than 180 systolic therefore received Labetalol 10mg ivp x 2. He was transferred to [facility] for further workup. On physical exam at the time of presentation pt had no drift X 4 and 5/5 strength throughout. Seemingly the right sided hemiparesis resolved. However he still had productive aphasia and has severe dysarthria.     NIHSS - 8  MRS - 4 (05 Aug 2017 13:38)    Now assessed as cerebral embolism with cerebral infarction. L ICA distribution stroke - likely etiology being embolic stroke from a proximal source like cardiac/paradoxical source of embolism.     When spoke to patient, remains with significant dysarthria. He is able to nod appropriate and write his wishes. He is adamant he does not want a loop recorder. Currently without cardiac complaints and denies CP, SOB, palpitations, LOC.    PMH:   Tachycardia  Developmental Anomaly  Asthma, Intrinsic  Chronic Latent Schizophrenia  Acute (Undifferentiated) Schizophrenia    PSH:   No significant past surgical history    Medications:   labetalol Injectable 10 milliGRAM(s) IV Push every 10 minutes PRN  pantoprazole    Tablet 40 milliGRAM(s) Oral before breakfast  atorvastatin 80 milliGRAM(s) Oral at bedtime  sodium chloride 0.9%. 1000 milliLiter(s) IV Continuous <Continuous>  enoxaparin Injectable 40 milliGRAM(s) SubCutaneous every 24 hours  aspirin  chewable 81 milliGRAM(s) Oral daily  OLANZapine 5 milliGRAM(s) Oral daily  OLANZapine 20 milliGRAM(s) Oral at bedtime  buPROPion . 75 milliGRAM(s) Oral two times a day    Allergies:  No Known Allergies    FAMILY HISTORY: unable to obtain    Social History: unable to obtain    Review of Systems: see hpi  Constitutional: [ ] Fever [ ] Chills [ ] Fatigue [ ] Weight change   HEENT: [ ] Blurred vision [ ] Eye Pain [ ] Headache [ ] Runny nose [ ] Sore Throat   Respiratory: [ ] Cough [ ] Wheezing [ ] Shortness of breath  Cardiovascular: [ ] Chest Pain [ ] Palpitations [ ] NICOLE [ ] PND [ ] Orthopnea  Gastrointestinal: [ ] Abdominal Pain [ ] Diarrhea [ ] Constipation [ ] Hemorrhoids [ ] Nausea [ ] Vomiting  Genitourinary: [ ] Nocturia [ ] Dysuria [ ] Incontinence  Extremities: [ ] Swelling [ ] Joint Pain  Neurologic: [ ] Focal deficit [ ] Paresthesias [ ] Syncope  Lymphatic: [ ] Swelling [ ] Lymphadenopathy   Skin: [ ] Rash [ ] Ecchymoses [ ] Wounds [ ] Lesions  Psychiatry: [ ] Depression [ ] Suicidal/Homicidal Ideation [ ] Anxiety [ ] Sleep Disturbances  [ ] 10 point review of systems is otherwise negative except as mentioned above            [ ]Unable to obtain    Physical Exam:  T(C): 36.6 (08-08-17 @ 15:48), Max: 37.1 (08-07-17 @ 20:00)  HR: 78 (08-08-17 @ 15:48) (74 - 89)  BP: 180/95 (08-08-17 @ 15:48) (138/92 - 195/103)  RR: 18 (08-08-17 @ 15:48) (13 - 22)  SpO2: 94% (08-08-17 @ 15:48) (94% - 98%)  Wt(kg): --    08-07 @ 07:01  -  08-08 @ 07:00  --------------------------------------------------------  IN: 1425 mL / OUT: 1 mL / NET: 1424 mL    08-08 @ 07:01  -  08-08 @ 18:01  --------------------------------------------------------  IN: 0 mL / OUT: 0 mL / NET: 0 mL      Daily     Daily     Appearance: [x ] Normal [x ] NAD; dysarthria severe  Eyes: [x ] PERRL [x ] EOMI  HENT: [ x] Normal oral muscosa [x ]NC/AT  Cardiovascular: [x ] S1 [ x] S2 [x ] RRR [x ] No m/r/g [x ]No edema [ x] no JVP  Respiratory: [x ] Clear to auscultation bilaterally  Gastrointestinal: [x ] Soft [ x] Non-tender [x ] Non-distended [x ] BS+  Musculoskeletal: [x ] No clubbing [x ] No joint deformity   Neurologic: [x ] Non-focal  Lymphatic: [x ] No lymphadenopathy  Psychiatry: appears oriented, [x ] Mood & affect appropriate  Skin: [ x] No rashes [ x] No ecchymoses [x ] No cyanosis    Cardiovascular Diagnostic Testing:  ECG:     Echo:    Stress Testing:    Cath:    Interpretation of Telemetry:    Imaging:    Labs:                        14.6   9.0   )-----------( 194      ( 08 Aug 2017 06:40 )             41.7     08-08    141  |  103  |  13  ----------------------------<  90  3.8   |  24  |  0.76    Ca    8.3<L>      08 Aug 2017 06:39                Hemoglobin A1C, Whole Blood: 5.4 % (08-06 @ 09:02)    Thyroid Stimulating Hormone, Serum: 0.93 uIU/mL (08-07 @ 16:00) Patient seen and evaluated @ [facility] cath recovery  Consult for loop recorder    HPI per record:  Pt is a 67 year old male with a phx of schizophrenia, BPH, and HTN. Pt was at assisted living facility this morning and had witnessed onset of left sided facial droop, slurred speech and right sided hemiparesis at 9:00am. He was then taken to [facility] and found to have initial NIHSS of 9. CT head officially reported at 9:51 and he received a TPA bolus and completed full dose of TPa at 11AM. Pt has having BP greater than 180 systolic therefore received Labetalol 10mg ivp x 2. He was transferred to [facility] for further workup. On physical exam at the time of presentation pt had no drift X 4 and 5/5 strength throughout. Seemingly the right sided hemiparesis resolved. However he still had productive aphasia and has severe dysarthria.     NIHSS - 8  MRS - 4 (05 Aug 2017 13:38)    Now assessed as cerebral embolism with cerebral infarction. L ICA distribution stroke - likely etiology being embolic stroke from a proximal source like cardiac/paradoxical source of embolism.     When spoke to patient, remains with significant dysarthria. He is able to nod appropriate and write his wishes. He is adamant he does not want a loop recorder. Currently without cardiac complaints and denies CP, SOB, palpitations, LOC.    PMH:   Tachycardia  Developmental Anomaly  Asthma, Intrinsic  Chronic Latent Schizophrenia  Acute (Undifferentiated) Schizophrenia    PSH:   No significant past surgical history    Medications:   labetalol Injectable 10 milliGRAM(s) IV Push every 10 minutes PRN  pantoprazole    Tablet 40 milliGRAM(s) Oral before breakfast  atorvastatin 80 milliGRAM(s) Oral at bedtime  sodium chloride 0.9%. 1000 milliLiter(s) IV Continuous <Continuous>  enoxaparin Injectable 40 milliGRAM(s) SubCutaneous every 24 hours  aspirin  chewable 81 milliGRAM(s) Oral daily  OLANZapine 5 milliGRAM(s) Oral daily  OLANZapine 20 milliGRAM(s) Oral at bedtime  buPROPion . 75 milliGRAM(s) Oral two times a day    Allergies:  No Known Allergies    FAMILY HISTORY: unable to obtain    Social History: unable to obtain    Review of Systems: see hpi  Constitutional: [ ] Fever [ ] Chills [ ] Fatigue [ ] Weight change   HEENT: [ ] Blurred vision [ ] Eye Pain [ ] Headache [ ] Runny nose [ ] Sore Throat   Respiratory: [ ] Cough [ ] Wheezing [ ] Shortness of breath  Cardiovascular: [ ] Chest Pain [ ] Palpitations [ ] NICOLE [ ] PND [ ] Orthopnea  Gastrointestinal: [ ] Abdominal Pain [ ] Diarrhea [ ] Constipation [ ] Hemorrhoids [ ] Nausea [ ] Vomiting  Genitourinary: [ ] Nocturia [ ] Dysuria [ ] Incontinence  Extremities: [ ] Swelling [ ] Joint Pain  Neurologic: [ ] Focal deficit [ ] Paresthesias [ ] Syncope  Lymphatic: [ ] Swelling [ ] Lymphadenopathy   Skin: [ ] Rash [ ] Ecchymoses [ ] Wounds [ ] Lesions  Psychiatry: [ ] Depression [ ] Suicidal/Homicidal Ideation [ ] Anxiety [ ] Sleep Disturbances  [ ] 10 point review of systems is otherwise negative except as mentioned above            [ ]Unable to obtain    Physical Exam:  T(C): 36.6 (08-08-17 @ 15:48), Max: 37.1 (08-07-17 @ 20:00)  HR: 78 (08-08-17 @ 15:48) (74 - 89)  BP: 180/95 (08-08-17 @ 15:48) (138/92 - 195/103)  RR: 18 (08-08-17 @ 15:48) (13 - 22)  SpO2: 94% (08-08-17 @ 15:48) (94% - 98%)  Wt(kg): --    08-07 @ 07:01  -  08-08 @ 07:00  --------------------------------------------------------  IN: 1425 mL / OUT: 1 mL / NET: 1424 mL    08-08 @ 07:01  -  08-08 @ 18:01  --------------------------------------------------------  IN: 0 mL / OUT: 0 mL / NET: 0 mL      Daily     Daily     Appearance: [x ] Normal [x ] NAD; dysarthria severe  Eyes: [x ] PERRL [x ] EOMI  HENT: [ x] Normal oral muscosa [x ]NC/AT  Cardiovascular: [x ] S1 [ x] S2 [x ] RRR [x ] No m/r/g [x ]No edema [ x] no JVP  Respiratory: [x ] Clear to auscultation bilaterally  Gastrointestinal: [x ] Soft [ x] Non-tender [x ] Non-distended [x ] BS+  Musculoskeletal: [x ] No clubbing [x ] No joint deformity   Neurologic: [x ] Non-focal  Lymphatic: [x ] No lymphadenopathy  Psychiatry: appears oriented, [x ] Mood & affect appropriate  Skin: [ x] No rashes [ x] No ecchymoses [x ] No cyanosis    Cardiovascular Diagnostic Testing:  ECG: normal sinus    Echo:  < from: Transesophageal Echocardiogram (08.08.17 @ 14:51) >  Dimensions:    Normal Values:  LA:     2.8    2.0 - 4.0 cm  Ao:     3.4    2.0 - 3.8 cm  SEPTUM: 0.8    0.6 - 1.2 cm  PWT:    0.9    0.6 - 1.1 cm  LVIDd:  4.6    3.0 - 5.6 cm  LVIDs:  2.6    1.8 - 4.0 cm  Derived variables:  LVMI: 83 g/m2  RWT: 0.39  Fractional short: 43 %  EF (Teicholtz): 75 %  Doppler Peak Velocity (m/sec): AoV=0.9    < end of copied text >  < from: Transesophageal Echocardiogram (08.08.17 @ 14:51) >  Conclusions:  1. Aortic Root: 3.4 cm.  Sinotubular Junction: 3 cm.  Ascending Aorta: 3 cm.  2. Normal left atrium.  No left atrium or left atrial  appendage thrombus.   Normal left atrial appendage function  (velocity= 0.6 m/s).  3. Normal left ventricular internal dimensions and wall  thicknesses.  4. Normal left ventricular systolic function. No segmental  wall motion abnormalities.  5. Color Doppler demonstrates evidence of a patent foramen  ovale.  Unable to demonstrate right to left shunting with  injection of agitated saline.  Small PFO measuring 0.3 cm.  *** No previous Echo exam.  ------------------------------------------------------------------------  Confirmed on  8/8/2017 - 16:32:01    < end of copied text >    Interpretation of Telemetry: sinus    Imaging:  < from: MRI Head w/o Cont (08.06.17 @ 17:31) >  IMPRESSION:  Small acute infarct left frontal temporal regionand in the distal left   middle cerebral artery territory with positive diffusion, no hemorrhage.    < end of copied text >      Labs:                        14.6   9.0   )-----------( 194      ( 08 Aug 2017 06:40 )             41.7     08-08    141  |  103  |  13  ----------------------------<  90  3.8   |  24  |  0.76    Ca    8.3<L>      08 Aug 2017 06:39                Hemoglobin A1C, Whole Blood: 5.4 % (08-06 @ 09:02)    Thyroid Stimulating Hormone, Serum: 0.93 uIU/mL (08-07 @ 16:00) Patient seen and evaluated @ [facility] cath recovery  Consult for loop recorder    HPI per record:  Pt is a 67 year old male with a phx of schizophrenia, BPH, and HTN. Pt was at assisted living facility this morning and had witnessed onset of left sided facial droop, slurred speech and right sided hemiparesis at 9:00am. He was then taken to [facility] and found to have initial NIHSS of 9. CT head officially reported at 9:51 and he received a TPA bolus and completed full dose of TPa at 11AM. Pt has having BP greater than 180 systolic therefore received Labetalol 10mg ivp x 2. He was transferred to [facility] for further workup. On physical exam at the time of presentation pt had no drift X 4 and 5/5 strength throughout. Seemingly the right sided hemiparesis resolved. However he still had productive aphasia and has severe dysarthria.     NIHSS - 8  MRS - 4 (05 Aug 2017 13:38)    Now assessed as cerebral embolism with cerebral infarction. L ICA distribution stroke - likely etiology being embolic stroke from a proximal source like cardiac/paradoxical source of embolism.     When spoke to patient, remains with significant dysarthria. He is able to nod appropriate and write his wishes. He is adamant he does not want a loop recorder. Currently without cardiac complaints and denies CP, SOB, palpitations, LOC.    PMH:   Tachycardia  Developmental Anomaly  Asthma, Intrinsic  Chronic Latent Schizophrenia  Acute (Undifferentiated) Schizophrenia    PSH:   No significant past surgical history    Medications:   labetalol Injectable 10 milliGRAM(s) IV Push every 10 minutes PRN  pantoprazole    Tablet 40 milliGRAM(s) Oral before breakfast  atorvastatin 80 milliGRAM(s) Oral at bedtime  sodium chloride 0.9%. 1000 milliLiter(s) IV Continuous <Continuous>  enoxaparin Injectable 40 milliGRAM(s) SubCutaneous every 24 hours  aspirin  chewable 81 milliGRAM(s) Oral daily  OLANZapine 5 milliGRAM(s) Oral daily  OLANZapine 20 milliGRAM(s) Oral at bedtime  buPROPion . 75 milliGRAM(s) Oral two times a day    Allergies:  No Known Allergies    FAMILY HISTORY: unable to obtain    Social History: unable to obtain    Review of Systems: see hpi  Constitutional: [ ] Fever [ ] Chills [ ] Fatigue [ ] Weight change   HEENT: [ ] Blurred vision [ ] Eye Pain [ ] Headache [ ] Runny nose [ ] Sore Throat   Respiratory: [ ] Cough [ ] Wheezing [ ] Shortness of breath  Cardiovascular: [ ] Chest Pain [ ] Palpitations [ ] NICOLE [ ] PND [ ] Orthopnea  Gastrointestinal: [ ] Abdominal Pain [ ] Diarrhea [ ] Constipation [ ] Hemorrhoids [ ] Nausea [ ] Vomiting  Genitourinary: [ ] Nocturia [ ] Dysuria [ ] Incontinence  Extremities: [ ] Swelling [ ] Joint Pain  Neurologic: [ ] Focal deficit [ ] Paresthesias [ ] Syncope  Lymphatic: [ ] Swelling [ ] Lymphadenopathy   Skin: [ ] Rash [ ] Ecchymoses [ ] Wounds [ ] Lesions  Psychiatry: [ ] Depression [ ] Suicidal/Homicidal Ideation [ ] Anxiety [ ] Sleep Disturbances  [ ] 10 point review of systems is otherwise negative except as mentioned above            [ ]Unable to obtain    Physical Exam:  T(C): 36.6 (08-08-17 @ 15:48), Max: 37.1 (08-07-17 @ 20:00)  HR: 78 (08-08-17 @ 15:48) (74 - 89)  BP: 180/95 (08-08-17 @ 15:48) (138/92 - 195/103)  RR: 18 (08-08-17 @ 15:48) (13 - 22)  SpO2: 94% (08-08-17 @ 15:48) (94% - 98%)  Wt(kg): --    08-07 @ 07:01  -  08-08 @ 07:00  --------------------------------------------------------  IN: 1425 mL / OUT: 1 mL / NET: 1424 mL    08-08 @ 07:01  -  08-08 @ 18:01  --------------------------------------------------------  IN: 0 mL / OUT: 0 mL / NET: 0 mL      Daily     Daily     Appearance: [x ] Normal [x ] NAD; dysarthria severe  Eyes: [x ] PERRL [x ] EOMI  HENT: [ x] Normal oral muscosa [x ]NC/AT  Cardiovascular: [x ] S1 [ x] S2 [x ] RRR [x ] No m/r/g [x ]No edema [ x] no JVP  Respiratory: [x ] Clear to auscultation bilaterally  Gastrointestinal: [x ] Soft [ x] Non-tender [x ] Non-distended [x ] BS+  Musculoskeletal: [x ] No clubbing [x ] No joint deformity   Neurologic: [x ] Non-focal  Lymphatic: [x ] No lymphadenopathy  Psychiatry: appears oriented, [x ] Mood & affect appropriate  Skin: [ x] No rashes [ x] No ecchymoses [x ] No cyanosis    Cardiovascular Diagnostic Testing:  ECG: normal sinus    Echo:  < from: Transesophageal Echocardiogram (08.08.17 @ 14:51) >  Dimensions:    Normal Values:  LA:     2.8    2.0 - 4.0 cm  Ao:     3.4    2.0 - 3.8 cm  SEPTUM: 0.8    0.6 - 1.2 cm  PWT:    0.9    0.6 - 1.1 cm  LVIDd:  4.6    3.0 - 5.6 cm  LVIDs:  2.6    1.8 - 4.0 cm  Derived variables:  LVMI: 83 g/m2  RWT: 0.39  Fractional short: 43 %  EF (Teicholtz): 75 %  Doppler Peak Velocity (m/sec): AoV=0.9    < end of copied text >  < from: Transesophageal Echocardiogram (08.08.17 @ 14:51) >  Conclusions:  1. Aortic Root: 3.4 cm.  Sinotubular Junction: 3 cm.  Ascending Aorta: 3 cm.  2. Normal left atrium.  No left atrium or left atrial  appendage thrombus.   Normal left atrial appendage function  (velocity= 0.6 m/s).  3. Normal left ventricular internal dimensions and wall  thicknesses.  4. Normal left ventricular systolic function. No segmental  wall motion abnormalities.  5. Color Doppler demonstrates evidence of a patent foramen  ovale.  Unable to demonstrate right to left shunting with  injection of agitated saline.  Small PFO measuring 0.3 cm.  *** No previous Echo exam.  ------------------------------------------------------------------------  Confirmed on  8/8/2017 - 16:32:01    < end of copied text >    Interpretation of Telemetry: sinus     Imaging:  < from: MRI Head w/o Cont (08.06.17 @ 17:31) >  IMPRESSION:  Small acute infarct left frontal temporal regionand in the distal left   middle cerebral artery territory with positive diffusion, no hemorrhage.    < end of copied text >      Labs:                        14.6   9.0   )-----------( 194      ( 08 Aug 2017 06:40 )             41.7     08-08    141  |  103  |  13  ----------------------------<  90  3.8   |  24  |  0.76    Ca    8.3<L>      08 Aug 2017 06:39                Hemoglobin A1C, Whole Blood: 5.4 % (08-06 @ 09:02)    Thyroid Stimulating Hormone, Serum: 0.93 uIU/mL (08-07 @ 16:00)

## 2017-08-08 NOTE — PROGRESS NOTE ADULT - ASSESSMENT
ASSESSMENT:   66 Y/O man with vascular risk factors of age and HTN presented to OSH with acute onset of right hemiparesis and language disturbance in form of expressive aphasia. He was treated with IV tPA at [facility] he was subsequently transferred to [facility] for further evaluation. CT brain on admission did not show any acute intracranial pathology and CTA head and neck did not show any symptomatic intracranial or extracranial large vessel severe stenosis or occlusion. MRI brain subsequently showed left MCA distribution embolic-looking stroke.    Impression:   Cerebral embolism with cerebral infarction. L ICA distribution stroke - likely etiology being embolic stroke from a proximal source like cardiac/paradoxical source of embolism versus embolism from a possible hypercoagulable state.    NEURO: neurologically without acute change, continue close monitoring for neurologic deterioration, permissive HTN followed by gradual normotension, titrate statin to LDL goal less than 70, MRI Brain w/o as noted above. Physical therapy/OT recommends home, will need SLP.     ANTITHROMBOTIC THERAPY: ASA for secondary stroke prevention    PULMONARY: Protecting airway, saturating well     CARDIOVASCULAR: Pending XOCHILT with bubble study to rule out any structural cardiac cause of embolism. Continue with cardiac monitoring with telemetry for now and would likely benefit from prolonged cardiac monitoring which ICM to screen for occult cardiac arrhythmias like paroxysmal atrial fibrillation being the etiology of his cardioembolism     SBP goal: Gradual normotension    GASTROINTESTINAL: [place]phagia[place] [facility] [place] recommending D1 nectar with assistance, will need repeat evaluation as outpatient      Diet: D1 nectar    RENAL: BUN/Cr without acute change, good urine output      Na Goal: Greater than 135     Combs:  N    HEMATOLOGY: H/H without acute change, Platelets 194, no active bleeding      DVT ppx:  LMWH      ID: Afebrile, no leukocytosis     OTHER: Appreciate psych consult, patient with history of Schizoprenia, recommend resuming meds as have po access.     DISPOSITION: Home as per PT eval once stable and workup is complete. Plan was discussed with patient and his brother at bedside in detail. All the questions were answered and concerns were addressed.     CORE MEASURES:        Admission NIHSS: 8     TPA: YES      LDL/HDL: 83/50     Depression Screen: re-evaluate      Statin Therapy: y     Dysphagia Screen:  FAIL     Smoking  NO      Afib  NO     Stroke Education YES ASSESSMENT:   68 Y/O man with vascular risk factors of age and HTN presented to OSH with acute onset of right hemiparesis and language disturbance in form of expressive aphasia. He was treated with IV tPA at [facility] he was subsequently transferred to [facility] for further evaluation. CT brain on admission did not show any acute intracranial pathology and CTA head and neck did not show any symptomatic intracranial or extracranial large vessel severe stenosis or occlusion. MRI brain subsequently showed left MCA distribution embolic-looking stroke.    Impression:   Cerebral embolism with cerebral infarction. L ICA distribution stroke - likely etiology being embolic stroke from a proximal source like cardiac/paradoxical source of embolism     NEURO: Neurologically without acute change, continue close monitoring for neurologic deterioration, BP goals - gradual normotension, titrate statin to LDL goal less than 70, MRI Brain w/o as noted above. Physical therapy/OT recommends home, will need SLP     ANTITHROMBOTIC THERAPY: ASA for secondary stroke prevention    PULMONARY: Protecting airway, saturating well     CARDIOVASCULAR: Pending XOCHILT with bubble study to rule out any structural cardiac cause of embolism. Continue with cardiac monitoring with telemetry for now and would likely benefit from prolonged cardiac monitoring which ICM to screen for occult cardiac arrhythmias like paroxysmal atrial fibrillation being the etiology of his cardioembolism     SBP goal: Gradual normotension    GASTROINTESTINAL: Dysphagia: [facility] 8/7 recommending D1 nectar with assistance, will need repeat evaluation as outpatient      Diet: D1 nectar    RENAL: BUN/Cr without acute change, good urine output      Na Goal: Greater than 135     Combs:  N    HEMATOLOGY: H/H without acute change, Platelets 194, no active bleeding      DVT ppx:  LMWH      ID: Afebrile, no leukocytosis     OTHER: Appreciate psych consult, patient with history of Schizoprenia, recommend resuming meds as have po access.     DISPOSITION: Home as per PT eval once stable and workup is complete. Plan was discussed with patient and his brother at bedside in detail. All the questions were answered and concerns were addressed.     CORE MEASURES:        Admission NIHSS: 8     TPA: YES      LDL/HDL: 83/50     Depression Screen: re-evaluate      Statin Therapy: y     Dysphagia Screen:  FAIL     Smoking  NO      Afib  NO     Stroke Education YES

## 2017-08-08 NOTE — CONSULT NOTE ADULT - PROBLEM SELECTOR RECOMMENDATION 9
-likely embolic, PFO w/o R->L shunting  -r/o DVT  -r/o a fib/flutter - refusing loop recorder at this time, may offer event monitor as substitute    [id number]; evening [id number]

## 2017-08-08 NOTE — CONSULT NOTE ADULT - ASSESSMENT
67 year old man with a phx of schizophrenia, BPH, and HTN presents from assisted living facility with left sided facial droop, slurred speech and right sided hemiparesis, taken to [facility], received TPa, and transferred to [facility] for further workup; found to have L ICA distribution stroke suspected embolic by neurology; consult for loop recorder

## 2017-08-09 LAB
ANION GAP SERPL CALC-SCNC: 11 MMOL/L — SIGNIFICANT CHANGE UP (ref 5–17)
BUN SERPL-MCNC: 7 MG/DL — SIGNIFICANT CHANGE UP (ref 7–23)
CALCIUM SERPL-MCNC: 8.1 MG/DL — LOW (ref 8.4–10.5)
CHLORIDE SERPL-SCNC: 106 MMOL/L — SIGNIFICANT CHANGE UP (ref 96–108)
CO2 SERPL-SCNC: 25 MMOL/L — SIGNIFICANT CHANGE UP (ref 22–31)
CREAT SERPL-MCNC: 0.83 MG/DL — SIGNIFICANT CHANGE UP (ref 0.5–1.3)
GLUCOSE SERPL-MCNC: 95 MG/DL — SIGNIFICANT CHANGE UP (ref 70–99)
HCT VFR BLD CALC: 41.2 % — SIGNIFICANT CHANGE UP (ref 39–50)
HGB BLD-MCNC: 14.2 G/DL — SIGNIFICANT CHANGE UP (ref 13–17)
MCHC RBC-ENTMCNC: 33.5 PG — SIGNIFICANT CHANGE UP (ref 27–34)
MCHC RBC-ENTMCNC: 34.6 GM/DL — SIGNIFICANT CHANGE UP (ref 32–36)
MCV RBC AUTO: 96.8 FL — SIGNIFICANT CHANGE UP (ref 80–100)
PLATELET # BLD AUTO: 182 K/UL — SIGNIFICANT CHANGE UP (ref 150–400)
POTASSIUM SERPL-MCNC: 3.5 MMOL/L — SIGNIFICANT CHANGE UP (ref 3.5–5.3)
POTASSIUM SERPL-SCNC: 3.5 MMOL/L — SIGNIFICANT CHANGE UP (ref 3.5–5.3)
RBC # BLD: 4.25 M/UL — SIGNIFICANT CHANGE UP (ref 4.2–5.8)
RBC # FLD: 11 % — SIGNIFICANT CHANGE UP (ref 10.3–14.5)
SODIUM SERPL-SCNC: 142 MMOL/L — SIGNIFICANT CHANGE UP (ref 135–145)
WBC # BLD: 7.6 K/UL — SIGNIFICANT CHANGE UP (ref 3.8–10.5)
WBC # FLD AUTO: 7.6 K/UL — SIGNIFICANT CHANGE UP (ref 3.8–10.5)

## 2017-08-09 PROCEDURE — 93970 EXTREMITY STUDY: CPT | Mod: 26

## 2017-08-09 PROCEDURE — 33282: CPT

## 2017-08-09 PROCEDURE — 99233 SBSQ HOSP IP/OBS HIGH 50: CPT

## 2017-08-09 RX ADMIN — OLANZAPINE 20 MILLIGRAM(S): 15 TABLET, FILM COATED ORAL at 21:23

## 2017-08-09 RX ADMIN — ENOXAPARIN SODIUM 40 MILLIGRAM(S): 100 INJECTION SUBCUTANEOUS at 21:23

## 2017-08-09 RX ADMIN — OLANZAPINE 5 MILLIGRAM(S): 15 TABLET, FILM COATED ORAL at 09:00

## 2017-08-09 RX ADMIN — ATORVASTATIN CALCIUM 80 MILLIGRAM(S): 80 TABLET, FILM COATED ORAL at 21:23

## 2017-08-09 RX ADMIN — BUPROPION HYDROCHLORIDE 75 MILLIGRAM(S): 150 TABLET, EXTENDED RELEASE ORAL at 06:21

## 2017-08-09 RX ADMIN — PANTOPRAZOLE SODIUM 40 MILLIGRAM(S): 20 TABLET, DELAYED RELEASE ORAL at 06:22

## 2017-08-09 RX ADMIN — Medication 81 MILLIGRAM(S): at 11:43

## 2017-08-09 RX ADMIN — BUPROPION HYDROCHLORIDE 75 MILLIGRAM(S): 150 TABLET, EXTENDED RELEASE ORAL at 18:17

## 2017-08-09 NOTE — PROGRESS NOTE ADULT - ASSESSMENT
ASSESSMENT:   68 Y/O man with vascular risk factors of age and HTN presented to OSH with acute onset of right hemiparesis and language disturbance in form of expressive aphasia. He was treated with IV tPA at [facility] he was subsequently transferred to [facility] for further evaluation. CT brain on admission did not show any acute intracranial pathology and CTA head and neck did not show any symptomatic intracranial or extracranial large vessel severe stenosis or occlusion. MRI brain subsequently showed left MCA distribution embolic-looking stroke.    Impression:   Cerebral embolism with cerebral infarction. L ICA distribution stroke - likely etiology being embolic stroke from a proximal source like cardiac/paradoxical source of embolism     NEURO: Neurologically without acute change, BP goals - gradual normotension, titrate statin to LDL goal less than 70, MRI Brain w/o as noted above. Physical therapy/OT recommends home, will need SLP     ANTITHROMBOTIC THERAPY: ASA for secondary stroke prevention    PULMONARY: Protecting airway, saturating well     CARDIOVASCULAR: XOCHILT: ef 75%, minimal TR, PFO, LE duplex to screen for paradoxical embolism . Continue with cardiac monitoring with telemetry for now and would likely benefit from prolonged cardiac monitoring which ICM to screen for occult cardiac arrhythmias like paroxysmal atrial fibrillation being the etiology of his cardioembolism     SBP goal: Gradual normotension    GASTROINTESTINAL: Dysphagia: [facility] 8/7 recommending D1 nectar with assistance, will need repeat evaluation as outpatient      Diet: D1 nectar    RENAL: BUN/Cr without acute change, good urine output      Na Goal: Greater than 135     Combs:  N    HEMATOLOGY: H/H without acute change, Platelets 182, no active bleeding, LE duplex to screen for DVT and possible paradoxical embolism.      DVT ppx:  LMWH      ID: Afebrile, no leukocytosis     OTHER: Appreciate psych consult- denies depressed/anxious/ routine observation- continue zyprexa and wellbutrin, trazodone, tsh,b12,folate,rprp,hiv , patient with history of Schizoprenia- no psych contraindication to discharge. Continue regimen.     DISPOSITION: Home as per PT eval once workup complete, plan of care d/w brother via telephone per team today.     CORE MEASURES:        Admission NIHSS: 8     TPA: YES      LDL/HDL: 83/50     Depression Screen:0     Statin Therapy: y     Dysphagia Screen:  FAIL     Smoking  NO      Afib  NO     Stroke Education YES ASSESSMENT:   68 Y/O man with vascular risk factors of age and HTN presented to OSH with acute onset of right hemiparesis and language disturbance in form of expressive aphasia. He was treated with IV tPA at [facility] he was subsequently transferred to [facility] for further evaluation. CT brain on admission did not show any acute intracranial pathology and CTA head and neck did not show any symptomatic intracranial or extracranial large vessel severe stenosis or occlusion. MRI brain subsequently showed left MCA distribution embolic-looking stroke.    Impression:   Cerebral embolism with cerebral infarction. L ICA distribution stroke - likely etiology being embolic stroke from a proximal source like cardiac/paradoxical source of embolism     NEURO: Neurologically without acute change, BP goals - gradual normotension, titrate statin to LDL goal less than 70, MRI Brain w/o as noted above. Physical therapy/OT recommends home, will need SLP     ANTITHROMBOTIC THERAPY: ASA for secondary stroke prevention    PULMONARY: Protecting airway, saturating well     CARDIOVASCULAR: XOCHILT: LVEF 75%, minimal TR, PFO, LE duplex to screen for paradoxical embolism . Continue with cardiac monitoring with telemetry for now and would likely benefit from prolonged cardiac monitoring which ICM to screen for occult cardiac arrhythmias like paroxysmal atrial fibrillation being the etiology of his cardioembolism     SBP goal: Gradual normotension    GASTROINTESTINAL: Dysphagia: [facility] 8/7 recommending D1 nectar with assistance, will need repeat evaluation as outpatient      Diet: D1 nectar    RENAL: BUN/Cr without acute change, good urine output      Na Goal: Greater than 135     Combs:  N    HEMATOLOGY: H/H without acute change, Platelets 182, no active bleeding, LE duplex to screen for DVT and possible paradoxical embolism.      DVT ppx:  LMWH      ID: Afebrile, no leukocytosis     OTHER: Appreciate psych consult - denies depressed/anxious/ routine observation- continue Zyprexa and wellbutrin, trazodone, tsh,b12,folate,rprp,hiv , patient with history of schizoprenia - no psych contraindication to discharge. Continue regimen.     DISPOSITION: Home as per PT eval once workup complete, plan of care d/w brother via telephone per team today.     CORE MEASURES:        Admission NIHSS: 8     TPA: YES      LDL/HDL: 83/50     Depression Screen:0     Statin Therapy: y     Dysphagia Screen:  FAIL     Smoking  NO      Afib  NO     Stroke Education YES ASSESSMENT:   68 Y/O man with vascular risk factors of age and HTN presented to OSH with acute onset of right hemiparesis and language disturbance in form of expressive aphasia. He was treated with IV tPA at [facility] he was subsequently transferred to [facility] for further evaluation. CT brain on admission did not show any acute intracranial pathology and CTA head and neck did not show any symptomatic intracranial or extracranial large vessel severe stenosis or occlusion. MRI brain subsequently showed left MCA distribution embolic-looking stroke.    Impression:   Cerebral embolism with cerebral infarction. L ICA distribution stroke - likely etiology being embolic stroke from a proximal source like cardiac/paradoxical source of embolism     NEURO: Neurologically without acute change, BP goals - gradual normotension, titrate statin to LDL goal less than 70, MRI Brain w/o as noted above. Physical therapy/OT recommends home, will need SLP     ANTITHROMBOTIC THERAPY: ASA for secondary stroke prevention    PULMONARY: Protecting airway, saturating well     CARDIOVASCULAR: XOCHILT: LVEF 75%, minimal TR, small PFO, LE duplex to screen for paradoxical embolism . Continue with cardiac monitoring with telemetry for now and would likely benefit from prolonged cardiac monitoring which ICM to screen for occult cardiac arrhythmias like paroxysmal atrial fibrillation being the etiology of his cardioembolism     SBP goal: Gradual normotension    GASTROINTESTINAL: Dysphagia: [facility] [place] 8/7 recommending D1 nectar with assistance, will need repeat evaluation as outpatient      Diet: D1 nectar    RENAL: BUN/Cr without acute change, good urine output      Na Goal: Greater than 135     Combs:  N    HEMATOLOGY: H/H without acute change, Platelets 182, no active bleeding, LE duplex to screen for DVT and possible paradoxical embolism.      DVT ppx:  LMWH      ID: Afebrile, no leukocytosis     OTHER: Appreciate psych consult - denies depressed/anxious/ routine observation- continue Zyprexa and wellbutrin, trazodone, tsh,b12,folate,rprp,hiv , patient with history of schizoprenia - no psych contraindication to discharge. Continue regimen. LE duplex to r/o DVT being the paradoxical source of embolism due to presence of small PFO    DISPOSITION: Home as per PT eval once workup complete, plan of care d/w brother via telephone per team today.     CORE MEASURES:        Admission NIHSS: 8     TPA: YES      LDL/HDL: 83/50     Depression Screen:0     Statin Therapy: y     Dysphagia Screen:  FAIL     Smoking  NO      Afib  NO     Stroke Education YES

## 2017-08-09 NOTE — PROGRESS NOTE ADULT - SUBJECTIVE AND OBJECTIVE BOX
THE PATIENT WAS SEEN AND EXAMINED BY ME WITH THE HOUSESTAFF AND STROKE TEAM DURING MORNING ROUNDS.   HPI:  67 year old male with a phx of schizophrenia, BPH, and HTN. Pt was at assisted living facility this morning and had witnessed onset of left sided facial droop, slurred speech and right sided hemiparesis at 9:00am. He was then taken to [facility] and found to have initial NIHSS of 9. CT head officially reported at 9:51 and he received a TPA bolus and completed full dose of TPa at 11AM. Pt has having BP greater than 180 systolic therefore received Labetalol 10mg ivp x 2. He was transferred to [facility] for further workup. On physical exam at the time of presentation pt had no drift X 4 and 5/5 strength throughout. Seemingly the right sided hemiparesis resolved. However he still had productive aphasia and has severe dysarthria. NIHSS - 8  MRS - 4    SUBJECTIVE: No events overnight.  No new neurologic complaints.      labetalol Injectable 10 milliGRAM(s) IV Push every 10 minutes PRN  pantoprazole    Tablet 40 milliGRAM(s) Oral before breakfast  atorvastatin 80 milliGRAM(s) Oral at bedtime  sodium chloride 0.9%. 1000 milliLiter(s) IV Continuous <Continuous>  enoxaparin Injectable 40 milliGRAM(s) SubCutaneous every 24 hours  aspirin  chewable 81 milliGRAM(s) Oral daily  OLANZapine 5 milliGRAM(s) Oral daily  OLANZapine 20 milliGRAM(s) Oral at bedtime  buPROPion . 75 milliGRAM(s) Oral two times a day      PHYSICAL EXAM:   Vital Signs Last 24 Hrs  T(C): 36.4 (09 Aug 2017 08:32), Max: 36.6 (08 Aug 2017 15:00)  T(F): 97.5 (09 Aug 2017 08:32), Max: 97.9 (08 Aug 2017 15:00)  HR: 79 (09 Aug 2017 08:32) (68 - 91)  BP: 167/101 (09 Aug 2017 08:32) (142/98 - 195/103)  BP(mean): --  RR: 18 (09 Aug 2017 08:32) (18 - 19)  SpO2: 96% (09 Aug 2017 08:32) (94% - 99%)    General: No acute distress  HEENT: EOM intact, visual fields full  Abdomen: Soft, nontender, nondistended   Extremities: No edema    NEUROLOGICAL EXAM:  Mental status: Awake, alert, oriented x3 and able to ID objects (based on yes/no answers with choices), mute generated a few groan like noises, no neglect, follows all commands, is able to write   Cranial Nerves: Mild right facial palsy, no nystagmus, tongue midline, shoulder shrug intact bilaterally.  Motor exam: Normal tone, no drift, 5/5 RUE, 5/5 RLE, 5/5 LUE, 5/5 LLE, normal fine finger movements.  Sensation: Intact to light touch   Coordination/ Gait: No dysmetria, gait not assessed     LABS:                        14.2   7.6   )-----------( 182      ( 09 Aug 2017 05:55 )             41.2    08-09    142  |  106  |  7   ----------------------------<  95  3.5   |  25  |  0.83    Ca    8.1<L>      09 Aug 2017 05:55      Hemoglobin A1C, Whole Blood: 5.4 % (08-06 @ 09:02)      IMAGING: Reviewed by me.     CT Head w/o Cont (08.25.12 @ 13:49) >  IMPRESSION:  No acute intracranial hemorrhage.  CT Brain Stroke Protocol (08.05.17 @ 09:47) >  No acute intracranial hemorrhage, mass effect, or midline shift.   CT Head No Cont (08.06.17 @ 15:39) >  Limited study due to motion with foci of white matter decreased   attenuation likely microvascular disease without hemorrhage or midline   shift, suggest repeat imaging when patient can hold still, or MRI if   there are no MRI contraindications. There is no acute hemorrhage or   midline shift.  MRI Head w/o Cont (08.06.17 @ 17:31) >  Small acute infarct left frontal temporal regionand in the distal left   middle cerebral artery territory with positive diffusion, no hemorrhage. THE PATIENT WAS SEEN AND EXAMINED BY ME WITH THE HOUSESTAFF AND STROKE TEAM DURING MORNING ROUNDS.   HPI:  67 year old male with a phx of schizophrenia, BPH, and HTN. Pt was at assisted living facility morning of admission and had witnessed onset of left sided facial droop, slurred speech and right sided hemiparesis at 9:00am 8/5. He was then taken to [facility] and found to have initial NIHSS of 9. CT head officially reported at 9:51 and he received a TPA bolus and completed full dose of TPa at 11AM. Pt has having BP greater than 180 systolic therefore received Labetalol 10mg ivp x 2. He was transferred to [facility] for further workup. On physical exam at the time of presentation pt had no drift X 4 and 5/5 strength throughout. Seemingly the right sided hemiparesis resolved. However he still had productive aphasia and has severe dysarthria. NIHSS - 8  MRS - 4    SUBJECTIVE: No events overnight.  No new neurologic complaints.      labetalol Injectable 10 milliGRAM(s) IV Push every 10 minutes PRN  pantoprazole    Tablet 40 milliGRAM(s) Oral before breakfast  atorvastatin 80 milliGRAM(s) Oral at bedtime  sodium chloride 0.9%. 1000 milliLiter(s) IV Continuous <Continuous>  enoxaparin Injectable 40 milliGRAM(s) SubCutaneous every 24 hours  aspirin  chewable 81 milliGRAM(s) Oral daily  OLANZapine 5 milliGRAM(s) Oral daily  OLANZapine 20 milliGRAM(s) Oral at bedtime  buPROPion . 75 milliGRAM(s) Oral two times a day      PHYSICAL EXAM:   Vital Signs Last 24 Hrs  T(C): 36.4 (09 Aug 2017 08:32), Max: 36.6 (08 Aug 2017 15:00)  T(F): 97.5 (09 Aug 2017 08:32), Max: 97.9 (08 Aug 2017 15:00)  HR: 79 (09 Aug 2017 08:32) (68 - 91)  BP: 167/101 (09 Aug 2017 08:32) (142/98 - 195/103)  BP(mean): --  RR: 18 (09 Aug 2017 08:32) (18 - 19)  SpO2: 96% (09 Aug 2017 08:32) (94% - 99%)    LABS:                        14.2   7.6   )-----------( 182      ( 09 Aug 2017 05:55 )             41.2    08-09    142  |  106  |  7   ----------------------------<  95  3.5   |  25  |  0.83    Ca    8.1<L>      09 Aug 2017 05:55      Hemoglobin A1C, Whole Blood: 5.4 % (08-06 @ 09:02)      IMAGING: Reviewed by me.     CT Head w/o Cont (08.25.12 @ 13:49) >  IMPRESSION:  No acute intracranial hemorrhage.  CT Brain Stroke Protocol (08.05.17 @ 09:47) >  No acute intracranial hemorrhage, mass effect, or midline shift.   CT Head No Cont (08.06.17 @ 15:39) >  Limited study due to motion with foci of white matter decreased   attenuation likely microvascular disease without hemorrhage or midline   shift, suggest repeat imaging when patient can hold still, or MRI if   there are no MRI contraindications. There is no acute hemorrhage or   midline shift.  MRI Head w/o Cont (08.06.17 @ 17:31) >  Small acute infarct left frontal temporal regionand in the distal left   middle cerebral artery territory with positive diffusion, no hemorrhage. THE PATIENT WAS SEEN AND EXAMINED BY ME WITH THE HOUSESTAFF AND STROKE TEAM DURING MORNING ROUNDS.   HPI:  67 year old male with a phx of schizophrenia, BPH, and HTN. Pt was at assisted living facility morning of admission and had witnessed onset of left sided facial droop, slurred speech and right sided hemiparesis at 9:00am 8/5. He was then taken to [facility] and found to have initial NIHSS of 9. CT head officially reported at 9:51 and he received a TPA bolus and completed full dose of TPa at 11AM. Pt has having BP greater than 180 systolic therefore received Labetalol 10mg ivp x 2. He was transferred to [facility] for further workup. On physical exam at the time of presentation pt had no drift X 4 and 5/5 strength throughout. Seemingly the right sided hemiparesis resolved. However he still had productive aphasia and has severe dysarthria. NIHSS - 8  MRS - 4    SUBJECTIVE: No events overnight.  No new neurologic complaints.      labetalol Injectable 10 milliGRAM(s) IV Push every 10 minutes PRN  pantoprazole    Tablet 40 milliGRAM(s) Oral before breakfast  atorvastatin 80 milliGRAM(s) Oral at bedtime  sodium chloride 0.9%. 1000 milliLiter(s) IV Continuous <Continuous>  enoxaparin Injectable 40 milliGRAM(s) SubCutaneous every 24 hours  aspirin  chewable 81 milliGRAM(s) Oral daily  OLANZapine 5 milliGRAM(s) Oral daily  OLANZapine 20 milliGRAM(s) Oral at bedtime  buPROPion . 75 milliGRAM(s) Oral two times a day      PHYSICAL EXAM:   Vital Signs Last 24 Hrs  T(C): 36.4 (09 Aug 2017 08:32), Max: 36.6 (08 Aug 2017 15:00)  T(F): 97.5 (09 Aug 2017 08:32), Max: 97.9 (08 Aug 2017 15:00)  HR: 79 (09 Aug 2017 08:32) (68 - 91)  BP: 167/101 (09 Aug 2017 08:32) (142/98 - 195/103)  BP(mean): --  RR: 18 (09 Aug 2017 08:32) (18 - 19)  SpO2: 96% (09 Aug 2017 08:32) (94% - 99%)    General: No acute distress  HEENT: EOM intact, visual fields full  Abdomen: Soft, nontender, nondistended   Extremities: No edema    NEUROLOGICAL EXAM:  Mental status: Awake, alert, oriented x3 and able to ID objects (based on yes/no answers with choices), mute generated a few groan like noises, no neglect, follows all commands, is able to write   Cranial Nerves: Mild right facial palsy, no nystagmus, tongue midline, shoulder shrug intact bilaterally.  Motor exam: Normal tone, no drift, 5/5 RUE, 5/5 RLE, 5/5 LUE, 5/5 LLE, normal fine finger movements.  Sensation: Intact to light touch   Coordination/ Gait: No dysmetria, gait not assessed     LABS:                        14.2   7.6   )-----------( 182      ( 09 Aug 2017 05:55 )             41.2    08-09    142  |  106  |  7   ----------------------------<  95  3.5   |  25  |  0.83    Ca    8.1<L>      09 Aug 2017 05:55      Hemoglobin A1C, Whole Blood: 5.4 % (08-06 @ 09:02)      IMAGING: Reviewed by me.     CT Head w/o Cont (08.25.12 @ 13:49) >  IMPRESSION:  No acute intracranial hemorrhage.  CT Brain Stroke Protocol (08.05.17 @ 09:47) >  No acute intracranial hemorrhage, mass effect, or midline shift.   CT Head No Cont (08.06.17 @ 15:39) >  Limited study due to motion with foci of white matter decreased   attenuation likely microvascular disease without hemorrhage or midline   shift, suggest repeat imaging when patient can hold still, or MRI if   there are no MRI contraindications. There is no acute hemorrhage or   midline shift.  MRI Head w/o Cont (08.06.17 @ 17:31) >  Small acute infarct left frontal temporal regionand in the distal left   middle cerebral artery territory with positive diffusion, no hemorrhage. THE PATIENT WAS SEEN AND EXAMINED BY ME WITH THE HOUSESTAFF AND STROKE TEAM DURING MORNING ROUNDS.     HPI:  67 year old male with a phx of schizophrenia, BPH, and HTN. Pt was at assisted living facility morning of admission and had witnessed onset of left sided facial droop, slurred speech and right sided hemiparesis at 9:00am 8/5. He was then taken to [facility] and found to have initial NIHSS of 9. CT head officially reported at 9:51 and he received a TPA bolus and completed full dose of TPa at 11AM. Pt has having BP greater than 180 systolic therefore received Labetalol 10mg ivp x 2. He was transferred to [facility] for further workup. On physical exam at the time of presentation pt had no drift X 4 and 5/5 strength throughout. Seemingly the right sided hemiparesis resolved. However he still had productive aphasia and has severe dysarthria. NIHSS - 8  MRS - 4    SUBJECTIVE: No events overnight.  No new neurologic complaints.      labetalol Injectable 10 milliGRAM(s) IV Push every 10 minutes PRN  pantoprazole    Tablet 40 milliGRAM(s) Oral before breakfast  atorvastatin 80 milliGRAM(s) Oral at bedtime  sodium chloride 0.9%. 1000 milliLiter(s) IV Continuous <Continuous>  enoxaparin Injectable 40 milliGRAM(s) SubCutaneous every 24 hours  aspirin  chewable 81 milliGRAM(s) Oral daily  OLANZapine 5 milliGRAM(s) Oral daily  OLANZapine 20 milliGRAM(s) Oral at bedtime  buPROPion . 75 milliGRAM(s) Oral two times a day      PHYSICAL EXAM:   Vital Signs Last 24 Hrs  T(C): 36.4 (09 Aug 2017 08:32), Max: 36.6 (08 Aug 2017 15:00)  T(F): 97.5 (09 Aug 2017 08:32), Max: 97.9 (08 Aug 2017 15:00)  HR: 79 (09 Aug 2017 08:32) (68 - 91)  BP: 167/101 (09 Aug 2017 08:32) (142/98 - 195/103)  BP(mean): --  RR: 18 (09 Aug 2017 08:32) (18 - 19)  SpO2: 96% (09 Aug 2017 08:32) (94% - 99%)    General: No acute distress  HEENT: EOM intact, visual fields full  Abdomen: Soft, nontender, nondistended   Extremities: No edema    NEUROLOGICAL EXAM:  Mental status: Awake, alert, oriented x3 and able to ID objects (based on yes/no answers with choices), mute generated a few groan like noises, no neglect, follows all commands, is able to write   Cranial Nerves: Mild right facial palsy, no nystagmus, tongue midline, shoulder shrug intact bilaterally.  Motor exam: Normal tone, no drift, 5/5 RUE, 5/5 RLE, 5/5 LUE, 5/5 LLE, normal fine finger movements.  Sensation: Intact to light touch   Coordination/ Gait: No dysmetria, gait not assessed     LABS:                        14.2   7.6   )-----------( 182      ( 09 Aug 2017 05:55 )             41.2    08-09    142  |  106  |  7   ----------------------------<  95  3.5   |  25  |  0.83    Ca    8.1<L>      09 Aug 2017 05:55      Hemoglobin A1C, Whole Blood: 5.4 % (08-06 @ 09:02)      IMAGING: Reviewed by me.     CT Head w/o Cont (08.25.12 @ 13:49) >  IMPRESSION:  No acute intracranial hemorrhage.  CT Brain Stroke Protocol (08.05.17 @ 09:47) >  No acute intracranial hemorrhage, mass effect, or midline shift.   CT Head No Cont (08.06.17 @ 15:39) >  Limited study due to motion with foci of white matter decreased   attenuation likely microvascular disease without hemorrhage or midline   shift, suggest repeat imaging when patient can hold still, or MRI if   there are no MRI contraindications. There is no acute hemorrhage or   midline shift.  MRI Head w/o Cont (08.06.17 @ 17:31) >  Small acute infarct left frontal temporal regionand in the distal left   middle cerebral artery territory with positive diffusion, no hemorrhage.

## 2017-08-09 NOTE — DIETITIAN INITIAL EVALUATION ADULT. - OTHER INFO
Patient seen for noted BMI 16.5.  Upon visit, patient found walking around room.  Just finished eating breakfast with an excellent appetite and intake. Patient tolerating Dysphagia 1 with nectar thick fluids as recommended by SLP.  No GI complaints but productive aphasia and severe dysarthia.  Limited dietary recall due to aphagia.  Reports weight stable at 140 pounds.  Dosing weight and BMI calculation inaccurate.  No vitamins at home.

## 2017-08-09 NOTE — DIETITIAN INITIAL EVALUATION ADULT. - NS AS NUTRI INTERV MEALS SNACK
Dysphagia 1 with nectar thick liquids, Health shakes to supplement/General/healthful diet/Texture-modified diet

## 2017-08-09 NOTE — DIETITIAN INITIAL EVALUATION ADULT. - ENERGY NEEDS
HT 67 inches,  pounds,  pounds, BMI 21.9  Dxd with CVA.  Skin intact.  Well nourished, good muscle and strength and mobility.

## 2017-08-09 NOTE — PROGRESS NOTE ADULT - SUBJECTIVE AND OBJECTIVE BOX
Subjective: Patient seen and examined. No new events except as noted.   s/p XOCHILT yesterday  refused ILR because he was alone     REVIEW OF SYSTEMS:    CONSTITUTIONAL: +weakness, no fevers or chills  EYES/ENT: No visual changes;  No vertigo or throat pain   NECK: No pain or stiffness  RESPIRATORY: No cough, wheezing, hemoptysis; No shortness of breath  CARDIOVASCULAR: No chest pain or palpitations  GASTROINTESTINAL: No abdominal or epigastric pain. No nausea, vomiting, or hematemesis; No diarrhea or constipation. No melena or hematochezia.  GENITOURINARY: No dysuria, frequency or hematuria  NEUROLOGICAL: dysarthric  SKIN: No itching, burning, rashes, or lesions   All other review of systems is negative unless indicated above.    MEDICATIONS:  MEDICATIONS  (STANDING):  pantoprazole    Tablet 40 milliGRAM(s) Oral before breakfast  atorvastatin 80 milliGRAM(s) Oral at bedtime  sodium chloride 0.9%. 1000 milliLiter(s) (75 mL/Hr) IV Continuous <Continuous>  enoxaparin Injectable 40 milliGRAM(s) SubCutaneous every 24 hours  aspirin  chewable 81 milliGRAM(s) Oral daily  OLANZapine 5 milliGRAM(s) Oral daily  OLANZapine 20 milliGRAM(s) Oral at bedtime  buPROPion . 75 milliGRAM(s) Oral two times a day      PHYSICAL EXAM:  T(C): 36.4 (08-09-17 @ 08:32), Max: 36.6 (08-08-17 @ 15:00)  HR: 79 (08-09-17 @ 08:32) (68 - 91)  BP: 167/101 (08-09-17 @ 08:32) (142/98 - 195/103)  RR: 18 (08-09-17 @ 08:32) (18 - 19)  SpO2: 96% (08-09-17 @ 08:32) (94% - 99%)  Wt(kg): --  I&O's Summary    08 Aug 2017 07:01  -  09 Aug 2017 07:00  --------------------------------------------------------  IN: 900 mL / OUT: 0 mL / NET: 900 mL        Weight (kg): 63.5 (08-09 @ 08:58)    Appearance: Normal	  HEENT:   Normal oral mucosa, PERRL, EOMI	  Lymphatic: No lymphadenopathy , no edema  Cardiovascular: Normal S1 S2, No JVD, No murmurs , Peripheral pulses palpable 2+ bilaterally  Respiratory: Lungs clear to auscultation, normal effort 	  Gastrointestinal:  Soft, Non-tender, + BS	  Skin: No rashes, No ecchymoses, No cyanosis, warm to touch  Musculoskeletal: Normal range of motion, normal strength  Psychiatry:  Mood & affect appropriate  Ext: No edema  neuro: Dysarthric     LABS:    CARDIAC MARKERS:                                14.2   7.6   )-----------( 182      ( 09 Aug 2017 05:55 )             41.2     08-09    142  |  106  |  7   ----------------------------<  95  3.5   |  25  |  0.83    Ca    8.1<L>      09 Aug 2017 05:55      proBNP:   Lipid Profile:   HgA1c:   TSH:           TELEMETRY: SR	    ECG:  	  RADIOLOGY:   DIAGNOSTIC TESTING:  [ ] Echocardiogram:  < from: Transesophageal Echocardiogram (08.08.17 @ 14:51) >  Dimensions:    Normal Values:  LA:     2.8    2.0 - 4.0 cm  Ao:     3.4    2.0 - 3.8 cm  SEPTUM: 0.8    0.6 - 1.2 cm  PWT:    0.9    0.6 - 1.1 cm  LVIDd:  4.6    3.0 - 5.6 cm  LVIDs:  2.6    1.8 - 4.0 cm  Derived variables:  LVMI: 83 g/m2  RWT: 0.39  Fractional short: 43 %  EF (Teicholtz): 75 %  Doppler Peak Velocity (m/sec): AoV=0.9  ------------------------------------------------------------------------  Observations:  Mitral Valve: Normal mitral valve.  Aortic Valve/Aorta: Normal trileaflet aortic valve.  Aortic Root: 3.4 cm.  Sinotubular Junction: 3 cm.  Ascending Aorta: 3 cm.  Left Atrium: Normal left atrium.  No left atrium or left  atrial appendage thrombus.   Normal left atrial appendage  function(velocity= 0.6 m/s).  Left Ventricle: Normal left ventricular systolic function.  No segmental wall motion abnormalities. Normal left  ventricular internal dimensions and wall thicknesses.  Right Heart: Normal right atrium. Normal right ventricular  size and function. Normal tricuspid valve. Minimal  tricuspid regurgitation. Normal pulmonic valve.  Pericardium/Pleura: Normal pericardium with no pericardial  effusion.  Hemodynamic: Estimated right atrial pressure is 8 mm Hg.  Color Doppler demonstrates evidence of a patent foramen  ovale.  Unable to demonstrate right to left shunting with  injection of agitated saline.  Small PFO measuring 0.3 cm.  ------------------------------------------------------------------------  Conclusions:  1. Aortic Root: 3.4 cm.  Sinotubular Junction: 3 cm.  Ascending Aorta: 3 cm.  2. Normal left atrium.  No left atrium or left atrial  appendage thrombus.   Normal left atrial appendage function  (velocity= 0.6 m/s).  3. Normal left ventricular internal dimensions and wall  thicknesses.  4. Normal left ventricular systolic function. No segmental  wall motion abnormalities.  5. Color Doppler demonstrates evidence of a patent foramen  ovale.  Unable to demonstrate right to left shunting with  injection of agitated saline.  Small PFO measuring 0.3 cm.  *** No previous Echo exam.      [ ]  Catheterization:  [ ] Stress Test:    OTHER:

## 2017-08-10 VITALS
RESPIRATION RATE: 18 BRPM | OXYGEN SATURATION: 100 % | SYSTOLIC BLOOD PRESSURE: 124 MMHG | TEMPERATURE: 98 F | DIASTOLIC BLOOD PRESSURE: 85 MMHG | HEART RATE: 72 BPM

## 2017-08-10 PROCEDURE — 99233 SBSQ HOSP IP/OBS HIGH 50: CPT

## 2017-08-10 RX ORDER — BUPROPION HYDROCHLORIDE 150 MG/1
1 TABLET, EXTENDED RELEASE ORAL
Qty: 60 | Refills: 0 | OUTPATIENT
Start: 2017-08-10 | End: 2017-09-09

## 2017-08-10 RX ORDER — ATORVASTATIN CALCIUM 80 MG/1
1 TABLET, FILM COATED ORAL
Qty: 30 | Refills: 0
Start: 2017-08-10 | End: 2017-09-09

## 2017-08-10 RX ORDER — BUPROPION HYDROCHLORIDE 150 MG/1
1 TABLET, EXTENDED RELEASE ORAL
Qty: 0 | Refills: 0 | COMMUNITY
Start: 2017-08-10

## 2017-08-10 RX ORDER — BUPROPION HYDROCHLORIDE 150 MG/1
1 TABLET, EXTENDED RELEASE ORAL
Qty: 60 | Refills: 0
Start: 2017-08-10 | End: 2017-09-09

## 2017-08-10 RX ORDER — ATORVASTATIN CALCIUM 80 MG/1
1 TABLET, FILM COATED ORAL
Qty: 0 | Refills: 0 | DISCHARGE
Start: 2017-08-10

## 2017-08-10 RX ORDER — ATORVASTATIN CALCIUM 80 MG/1
1 TABLET, FILM COATED ORAL
Qty: 90 | Refills: 3
Start: 2017-08-10 | End: 2018-08-04

## 2017-08-10 RX ORDER — ATORVASTATIN CALCIUM 80 MG/1
40 TABLET, FILM COATED ORAL AT BEDTIME
Qty: 0 | Refills: 0 | Status: DISCONTINUED | OUTPATIENT
Start: 2017-08-10 | End: 2017-08-10

## 2017-08-10 RX ADMIN — PANTOPRAZOLE SODIUM 40 MILLIGRAM(S): 20 TABLET, DELAYED RELEASE ORAL at 08:23

## 2017-08-10 RX ADMIN — OLANZAPINE 5 MILLIGRAM(S): 15 TABLET, FILM COATED ORAL at 08:23

## 2017-08-10 RX ADMIN — Medication 81 MILLIGRAM(S): at 11:44

## 2017-08-10 RX ADMIN — BUPROPION HYDROCHLORIDE 75 MILLIGRAM(S): 150 TABLET, EXTENDED RELEASE ORAL at 06:01

## 2017-08-10 NOTE — PROGRESS NOTE ADULT - ASSESSMENT
ASSESSMENT:   68 Y/O man with vascular risk factors of age and HTN presented to OSH with acute onset of right hemiparesis and language disturbance in form of expressive aphasia. He was treated with IV tPA at [facility] he was subsequently transferred to [facility] for further evaluation. CT brain on admission did not show any acute intracranial pathology and CTA head and neck did not show any symptomatic intracranial or extracranial large vessel severe stenosis or occlusion. MRI brain subsequently showed left MCA distribution embolic-looking stroke.    Impression:   Cerebral embolism with cerebral infarction. L ICA distribution stroke - likely etiology being embolic stroke from a proximal source like cardiac/paradoxical source of embolism     NEURO: Neurologically without acute change, BP goals - gradual normotension, ASA and statin for secondary stroke prevention, titrate statin to LDL goal less than 70. Physical therapy/OT recommends home,      ANTITHROMBOTIC THERAPY: ASA for secondary stroke prevention    PULMONARY: Protecting airway, saturating well     CARDIOVASCULAR: XOCHILT: LVEF 75%, minimal TR, small PFO, LE duplex to screen for paradoxical embolism . Continue with cardiac monitoring with telemetry for now and would likely benefit from prolonged cardiac monitoring which ICM to screen for occult cardiac arrhythmias like paroxysmal atrial fibrillation being the etiology of his cardioembolism     SBP goal: Gradual normotension    GASTROINTESTINAL: [place] [facility] [place] D1 nectar with assistance, will need repeat evaluation as outpatient, tolerating diet      Diet: D1 nectar    RENAL: BUN/Cr without acute change, good urine output      Na Goal: Greater than 135     Combs:  N    HEMATOLOGY:  no active bleeding, LE duplex: No evidence of bilateral lower extremity deep venous thrombosis.       DVT ppx:  LMWH      ID: Afebrile, no leukocytosis     OTHER: Appreciate psych consult - denies depressed/anxious/ routine observation- continue Zyprexa and wellbutrin, trazodone, tsh,b12,folate,rprp,hiv , patient with history of schizoprenia - no psych contraindication to discharge. Continue regimen.   DISPOSITION: Home as per PT eval once workup complete, plan of care d/w brother via telephone previously.     CORE MEASURES:        Admission NIHSS: 8     TPA: YES      LDL/HDL: 83/50     Depression Screen:0     Statin Therapy: y     Dysphagia Screen:  FAIL     Smoking  NO      Afib  NO     Stroke Education YES ASSESSMENT:   66 Y/O man with vascular risk factors of age and HTN presented to OSH with acute onset of right hemiparesis and language disturbance in form of expressive aphasia. He was treated with IV tPA at [facility] he was subsequently transferred to [facility] for further evaluation. CT brain on admission did not show any acute intracranial pathology and CTA head and neck did not show any symptomatic intracranial or extracranial large vessel severe stenosis or occlusion. MRI brain subsequently showed left MCA distribution embolic-looking stroke.    Impression:   Cerebral embolism with cerebral infarction. L ICA distribution stroke - likely etiology being embolic stroke from a proximal source like cardiac/paradoxical source of embolism     NEURO: Neurologically without acute change, BP goals - gradual normotension, ASA and statin for secondary stroke prevention, titrate statin to LDL goal less than 70. Physical therapy/OT recommends home,      ANTITHROMBOTIC THERAPY: ASA for secondary stroke prevention    PULMONARY: Protecting airway, saturating well     CARDIOVASCULAR: XOCHILT: LVEF 75%, minimal TR, small PFO. s/p ICM placement 08/09 to screen for occult cardiac arrhythmias like paroxysmal atrial fibrillation being the etiology of his cardioembolism     SBP goal: Gradual normotension    GASTROINTESTINAL: [place]ag[place] [facility] [place] recommending D1 nectar with assistance, will need repeat evaluation as outpatient, tolerating diet      Diet: D1 nectar    RENAL: BUN/Cr without acute change, good urine output      Na Goal: Greater than 135     Combs:  N    HEMATOLOGY:  no active bleeding, LE duplex: No evidence of bilateral lower extremity deep venous thrombosis.       DVT ppx:  LMWH      ID: Afebrile, no leukocytosis     OTHER: Appreciate psych consult - denies depressed/anxious/ routine observation- continue Zyprexa and wellbutrin, trazodone, tsh,b12,folate,rprp,hiv , patient with history of schizoprenia - no psych contraindication to discharge. Continue regimen.   DISPOSITION: Home as per PT rivera, plan of care d/w brother via telephone previously.     CORE MEASURES:        Admission NIHSS: 8     TPA: YES      LDL/HDL: 83/50     Depression Screen:0     Statin Therapy: y     Dysphagia Screen:  FAIL     Smoking  NO      Afib  NO     Stroke Education YES ASSESSMENT:   66 Y/O man with vascular risk factors of age and HTN presented to OSH with acute onset of right hemiparesis and language disturbance in form of expressive aphasia. He was treated with IV tPA at [facility] he was subsequently transferred to [facility] for further evaluation. CT brain on admission did not show any acute intracranial pathology and CTA head and neck did not show any symptomatic intracranial or extracranial large vessel severe stenosis or occlusion. MRI brain subsequently showed left MCA distribution embolic-looking stroke.    Impression:   Cerebral embolism with cerebral infarction. L ICA distribution stroke - likely etiology being embolic stroke from a proximal source like cardiac source of embolism     NEURO: Neurologically without acute change, BP goals - gradual normotension, ASA and statin for secondary stroke prevention, titrate statin to LDL goal less than 70. Physical therapy/OT recommends home     ANTITHROMBOTIC THERAPY: ASA for secondary stroke prevention    PULMONARY: Protecting airway, saturating well     CARDIOVASCULAR: XOCHILT: LVEF 75%, minimal TR, small PFO. s/p ICM placement 08/09 to screen for occult cardiac arrhythmias like paroxysmal atrial fibrillation being the etiology of his cardioembolism, could consider MRV pelvis as an outpatient to rule out any evidence of pelvic vein thrombosis     SBP goal: Gradual normotension    GASTROINTESTINAL: Dysphagia: [facility] [place] 8/7 recommending D1 nectar with assistance, will need repeat evaluation as outpatient, tolerating diet      Diet: D1 nectar, tolerating well    RENAL: BUN/Cr without acute change, good urine output      Na Goal: Greater than 135     Combs:  N    HEMATOLOGY:  No active bleeding, LE duplex: No evidence of bilateral lower extremity deep venous thrombosis.     DVT ppx:  LMWH      ID: Afebrile, no leukocytosis     OTHER: Appreciate psych consult - denies depressed/anxious/ routine observation- continue Zyprexa and wellbutrin, trazodone, TSH, vitamin B12, folate, RPR, HIV, patient with history of schizoprenia - no psych contraindication to discharge. Continue regimen.   DISPOSITION: Home as per PT rivera, plan of care d/w brother via telephone previously.     CORE MEASURES:        Admission NIHSS: 8     TPA: YES      LDL/HDL: 83/50     Depression Screen:0     Statin Therapy: y     Dysphagia Screen:  FAIL     Smoking  NO      Afib  NO     Stroke Education YES

## 2017-08-10 NOTE — PROGRESS NOTE ADULT - SUBJECTIVE AND OBJECTIVE BOX
THE PATIENT WAS SEEN AND EXAMINED BY ME WITH THE HOUSESTAFF AND STROKE TEAM DURING MORNING ROUNDS.   HPI:  67 year old male with a phx of schizophrenia, BPH, and HTN. Pt was at assisted living facility morning of admission and had witnessed onset of left sided facial droop, slurred speech and right sided hemiparesis at 9:00am 8/5. He was then taken to [facility] and found to have initial NIHSS of 9. CT head officially reported at 9:51 and he received a TPA bolus and completed full dose of TPa at 11AM. Pt has having BP greater than 180 systolic therefore received Labetalol 10mg ivp x 2. He was transferred to [facility] for further workup. On physical exam at the time of presentation pt had no drift X 4 and 5/5 strength throughout. Seemingly the right sided hemiparesis resolved. However he still had productive aphasia and has severe dysarthria. NIHSS - 8  MRS - 4      SUBJECTIVE: No events overnight.  No new neurologic complaints.      labetalol Injectable 10 milliGRAM(s) IV Push every 10 minutes PRN  pantoprazole    Tablet 40 milliGRAM(s) Oral before breakfast  atorvastatin 80 milliGRAM(s) Oral at bedtime  enoxaparin Injectable 40 milliGRAM(s) SubCutaneous every 24 hours  aspirin  chewable 81 milliGRAM(s) Oral daily  OLANZapine 5 milliGRAM(s) Oral daily  OLANZapine 20 milliGRAM(s) Oral at bedtime  buPROPion . 75 milliGRAM(s) Oral two times a day      PHYSICAL EXAM:   Vital Signs Last 24 Hrs  T(C): 36.8 (10 Aug 2017 07:44), Max: 36.8 (10 Aug 2017 07:44)  T(F): 98.2 (10 Aug 2017 07:44), Max: 98.2 (10 Aug 2017 07:44)  HR: 85 (10 Aug 2017 07:44) (76 - 96)  BP: 164/95 (10 Aug 2017 07:44) (126/81 - 171/95)  BP(mean): --  RR: 18 (10 Aug 2017 07:44) (18 - 19)  SpO2: 94% (10 Aug 2017 07:44) (94% - 99%)    General: No acute distress  HEENT: EOM intact, visual fields full  Abdomen: Soft, nontender, nondistended   Extremities: No edema    NEUROLOGICAL EXAM:  Mental status: Awake, alert, oriented x3 and able to ID objects (based on yes/no answers with choices), mute generated a few groan like noises, no neglect, follows all commands, is able to write   Cranial Nerves: Mild right facial palsy, no nystagmus, tongue midline, shoulder shrug intact bilaterally.  Motor exam: Normal tone, no drift, 5/5 RUE, 5/5 RLE, 5/5 LUE, 5/5 LLE, normal fine finger movements.  Sensation: Intact to light touch   Coordination/ Gait: No dysmetria, gait not assessed   LABS:                        14.2   7.6   )-----------( 182      ( 09 Aug 2017 05:55 )             41.2    08-09    142  |  106  |  7   ----------------------------<  95  3.5   |  25  |  0.83    Ca    8.1<L>      09 Aug 2017 05:55      Hemoglobin A1C, Whole Blood: 5.4 % (08-06 @ 09:02)      IMAGING: Reviewed by me.     CT Head w/o Cont (08.25.12 @ 13:49) >  IMPRESSION:  No acute intracranial hemorrhage.  CT Brain Stroke Protocol (08.05.17 @ 09:47) >  No acute intracranial hemorrhage, mass effect, or midline shift.   CT Head No Cont (08.06.17 @ 15:39) >  Limited study due to motion with foci of white matter decreased   attenuation likely microvascular disease without hemorrhage or midline   shift, suggest repeat imaging when patient can hold still, or MRI if   there are no MRI contraindications. There is no acute hemorrhage or   midline shift.  MRI Head w/o Cont (08.06.17 @ 17:31) >  Small acute infarct left frontal temporal regionand in the distal left   middle cerebral artery territory with positive diffusion, no hemorrhage THE PATIENT WAS SEEN AND EXAMINED BY ME WITH THE HOUSESTAFF AND STROKE TEAM DURING MORNING ROUNDS.     HPI:  67 year old male with a phx of schizophrenia, BPH, and HTN. Pt was at assisted living facility morning of admission and had witnessed onset of left sided facial droop, slurred speech and right sided hemiparesis at 9:00am 8/5. He was then taken to [facility] and found to have initial NIHSS of 9. CT head officially reported at 9:51 and he received a TPA bolus and completed full dose of TPa at 11AM. Pt has having BP greater than 180 systolic therefore received Labetalol 10mg ivp x 2. He was transferred to [facility] for further workup. On physical exam at the time of presentation pt had no drift X 4 and 5/5 strength throughout. Seemingly the right sided hemiparesis resolved. However he still had productive aphasia and has severe dysarthria. NIHSS - 8  MRS - 4      SUBJECTIVE: No events overnight.  No new neurologic complaints.      labetalol Injectable 10 milliGRAM(s) IV Push every 10 minutes PRN  pantoprazole    Tablet 40 milliGRAM(s) Oral before breakfast  atorvastatin 80 milliGRAM(s) Oral at bedtime  enoxaparin Injectable 40 milliGRAM(s) SubCutaneous every 24 hours  aspirin  chewable 81 milliGRAM(s) Oral daily  OLANZapine 5 milliGRAM(s) Oral daily  OLANZapine 20 milliGRAM(s) Oral at bedtime  buPROPion . 75 milliGRAM(s) Oral two times a day      PHYSICAL EXAM:   Vital Signs Last 24 Hrs  T(C): 36.8 (10 Aug 2017 07:44), Max: 36.8 (10 Aug 2017 07:44)  T(F): 98.2 (10 Aug 2017 07:44), Max: 98.2 (10 Aug 2017 07:44)  HR: 85 (10 Aug 2017 07:44) (76 - 96)  BP: 164/95 (10 Aug 2017 07:44) (126/81 - 171/95)  BP(mean): --  RR: 18 (10 Aug 2017 07:44) (18 - 19)  SpO2: 94% (10 Aug 2017 07:44) (94% - 99%)    General: No acute distress  HEENT: EOM intact, visual fields full  Abdomen: Soft, nontender, nondistended   Extremities: No edema    NEUROLOGICAL EXAM:  Mental status: Awake, alert, oriented x3 and able to ID objects (based on yes/no answers with choices), mute generated a few groan like noises, no neglect, follows all commands, is able to write   Cranial Nerves: Mild right facial palsy, no nystagmus, tongue midline, shoulder shrug intact bilaterally.  Motor exam: Normal tone, no drift, 5/5 RUE, 5/5 RLE, 5/5 LUE, 5/5 LLE, normal fine finger movements.  Sensation: Intact to light touch   Coordination/ Gait: No dysmetria, gait not assessed   LABS:                        14.2   7.6   )-----------( 182      ( 09 Aug 2017 05:55 )             41.2    08-09    142  |  106  |  7   ----------------------------<  95  3.5   |  25  |  0.83    Ca    8.1<L>      09 Aug 2017 05:55      Hemoglobin A1C, Whole Blood: 5.4 % (08-06 @ 09:02)      IMAGING: Reviewed by me.     CT Head w/o Cont (08.25.12 @ 13:49) >  IMPRESSION:  No acute intracranial hemorrhage.  CT Brain Stroke Protocol (08.05.17 @ 09:47) >  No acute intracranial hemorrhage, mass effect, or midline shift.   CT Head No Cont (08.06.17 @ 15:39) >  Limited study due to motion with foci of white matter decreased   attenuation likely microvascular disease without hemorrhage or midline   shift, suggest repeat imaging when patient can hold still, or MRI if   there are no MRI contraindications. There is no acute hemorrhage or   midline shift.  MRI Head w/o Cont (08.06.17 @ 17:31) >  Small acute infarct left frontal temporal regionand in the distal left   middle cerebral artery territory with positive diffusion, no hemorrhage

## 2017-08-10 NOTE — PROGRESS NOTE ADULT - SUBJECTIVE AND OBJECTIVE BOX
Subjective: Patient seen and examined. No new events except as noted.   s/p ILR    REVIEW OF SYSTEMS:    CONSTITUTIONAL: + weakness, fevers or chills  EYES/ENT: No visual changes;  No vertigo or throat pain   NECK: No pain or stiffness  RESPIRATORY: No cough, wheezing, hemoptysis; No shortness of breath  CARDIOVASCULAR: No chest pain or palpitations  GASTROINTESTINAL: No abdominal or epigastric pain. No nausea, vomiting, or hematemesis; No diarrhea or constipation. No melena or hematochezia.  GENITOURINARY: No dysuria, frequency or hematuria  NEUROLOGICAL: No numbness or weakness  SKIN: No itching, burning, rashes, or lesions   All other review of systems is negative unless indicated above.    MEDICATIONS:  MEDICATIONS  (STANDING):  pantoprazole    Tablet 40 milliGRAM(s) Oral before breakfast  enoxaparin Injectable 40 milliGRAM(s) SubCutaneous every 24 hours  aspirin  chewable 81 milliGRAM(s) Oral daily  OLANZapine 5 milliGRAM(s) Oral daily  OLANZapine 20 milliGRAM(s) Oral at bedtime  buPROPion . 75 milliGRAM(s) Oral two times a day  atorvastatin 40 milliGRAM(s) Oral at bedtime      PHYSICAL EXAM:  T(C): 36.8 (08-10-17 @ 07:44), Max: 36.8 (08-10-17 @ 07:44)  HR: 85 (08-10-17 @ 07:44) (76 - 96)  BP: 164/95 (08-10-17 @ 07:44) (126/81 - 171/95)  RR: 18 (08-10-17 @ 07:44) (18 - 19)  SpO2: 94% (08-10-17 @ 07:44) (94% - 99%)  Wt(kg): --  I&O's Summary    09 Aug 2017 07:01  -  10 Aug 2017 07:00  --------------------------------------------------------  IN: 1220 mL / OUT: 0 mL / NET: 1220 mL          Appearance: Normal	  HEENT:   Normal oral mucosa, PERRL, EOMI	  Lymphatic: No lymphadenopathy , no edema  Cardiovascular: Normal S1 S2, No JVD, No murmurs , Peripheral pulses palpable 2+ bilaterally  Respiratory: Lungs clear to auscultation, normal effort 	  Gastrointestinal:  Soft, Non-tender, + BS	  Skin: No rashes, No ecchymoses, No cyanosis, warm to touch  Musculoskeletal: Normal range of motion, normal strength  Psychiatry:  Mood & affect appropriate  Ext: No edema      LABS:    CARDIAC MARKERS:                                14.2   7.6   )-----------( 182      ( 09 Aug 2017 05:55 )             41.2     08-09    142  |  106  |  7   ----------------------------<  95  3.5   |  25  |  0.83    Ca    8.1<L>      09 Aug 2017 05:55      proBNP:   Lipid Profile:   HgA1c:   TSH:           TELEMETRY: SR	    ECG:  	  RADIOLOGY:   DIAGNOSTIC TESTING:  [ ] Echocardiogram:  [ ]  Catheterization:  [ ] Stress Test:    OTHER:

## 2017-08-10 NOTE — PROGRESS NOTE ADULT - PROBLEM SELECTOR PLAN 1
suspicious for embolic etiology  s/p ILR
suspicious for embolic etiology  XOCHILT negative for intracardiac thrombus  Will plan for Internal loop recorder once family is at bedside and patient is agreeable to proceed.
suspicious for embolic etiology  XOCHILT negative for intracardiac thrombus  Will plan for Internal loop recorder

## 2017-10-19 PROCEDURE — 85027 COMPLETE CBC AUTOMATED: CPT

## 2017-10-19 PROCEDURE — 70496 CT ANGIOGRAPHY HEAD: CPT

## 2017-10-19 PROCEDURE — 84484 ASSAY OF TROPONIN QUANT: CPT

## 2017-10-19 PROCEDURE — 85610 PROTHROMBIN TIME: CPT

## 2017-10-19 PROCEDURE — 70450 CT HEAD/BRAIN W/O DYE: CPT

## 2017-10-19 PROCEDURE — 87389 HIV-1 AG W/HIV-1&-2 AB AG IA: CPT

## 2017-10-19 PROCEDURE — 70551 MRI BRAIN STEM W/O DYE: CPT

## 2017-10-19 PROCEDURE — C1764: CPT

## 2017-10-19 PROCEDURE — 93005 ELECTROCARDIOGRAM TRACING: CPT

## 2017-10-19 PROCEDURE — 92611 MOTION FLUOROSCOPY/SWALLOW: CPT

## 2017-10-19 PROCEDURE — 80061 LIPID PANEL: CPT

## 2017-10-19 PROCEDURE — 80048 BASIC METABOLIC PNL TOTAL CA: CPT

## 2017-10-19 PROCEDURE — 99291 CRITICAL CARE FIRST HOUR: CPT | Mod: 25

## 2017-10-19 PROCEDURE — 82553 CREATINE MB FRACTION: CPT

## 2017-10-19 PROCEDURE — 82550 ASSAY OF CK (CPK): CPT

## 2017-10-19 PROCEDURE — 84436 ASSAY OF TOTAL THYROXINE: CPT

## 2017-10-19 PROCEDURE — 97166 OT EVAL MOD COMPLEX 45 MIN: CPT

## 2017-10-19 PROCEDURE — 86780 TREPONEMA PALLIDUM: CPT

## 2017-10-19 PROCEDURE — 86901 BLOOD TYPING SEROLOGIC RH(D): CPT

## 2017-10-19 PROCEDURE — 82607 VITAMIN B-12: CPT

## 2017-10-19 PROCEDURE — 74230 X-RAY XM SWLNG FUNCJ C+: CPT

## 2017-10-19 PROCEDURE — 97530 THERAPEUTIC ACTIVITIES: CPT

## 2017-10-19 PROCEDURE — 86850 RBC ANTIBODY SCREEN: CPT

## 2017-10-19 PROCEDURE — G0515: CPT

## 2017-10-19 PROCEDURE — 80053 COMPREHEN METABOLIC PANEL: CPT

## 2017-10-19 PROCEDURE — 97161 PT EVAL LOW COMPLEX 20 MIN: CPT

## 2017-10-19 PROCEDURE — 33282: CPT

## 2017-10-19 PROCEDURE — 83036 HEMOGLOBIN GLYCOSYLATED A1C: CPT

## 2017-10-19 PROCEDURE — 92523 SPEECH SOUND LANG COMPREHEN: CPT

## 2017-10-19 PROCEDURE — 93312 ECHO TRANSESOPHAGEAL: CPT

## 2017-10-19 PROCEDURE — 70498 CT ANGIOGRAPHY NECK: CPT

## 2017-10-19 PROCEDURE — 93306 TTE W/DOPPLER COMPLETE: CPT

## 2017-10-19 PROCEDURE — 86900 BLOOD TYPING SEROLOGIC ABO: CPT

## 2017-10-19 PROCEDURE — 93970 EXTREMITY STUDY: CPT

## 2017-10-19 PROCEDURE — 82746 ASSAY OF FOLIC ACID SERUM: CPT

## 2017-10-19 PROCEDURE — 92610 EVALUATE SWALLOWING FUNCTION: CPT

## 2017-10-19 PROCEDURE — 84443 ASSAY THYROID STIM HORMONE: CPT

## 2017-10-19 PROCEDURE — 85730 THROMBOPLASTIN TIME PARTIAL: CPT

## 2018-09-17 NOTE — ED ADULT NURSE NOTE - EENT WDL
Eyes with no visual disturbances.  Ears clean and dry and no hearing difficulties. Nose with pink mucosa and no drainage.  Mouth mucous membranes moist and pink.  No tenderness or swelling to throat or neck. will see patient in ED

## 2019-05-23 ENCOUNTER — EMERGENCY (EMERGENCY)
Facility: HOSPITAL | Age: 69
LOS: 1 days | Discharge: NURSING, REHAB OR LONG-TERM CARE | End: 2019-05-23
Attending: EMERGENCY MEDICINE | Admitting: EMERGENCY MEDICINE
Payer: MEDICARE

## 2019-05-23 VITALS
OXYGEN SATURATION: 98 % | RESPIRATION RATE: 16 BRPM | HEART RATE: 84 BPM | DIASTOLIC BLOOD PRESSURE: 86 MMHG | SYSTOLIC BLOOD PRESSURE: 152 MMHG

## 2019-05-23 VITALS
OXYGEN SATURATION: 100 % | RESPIRATION RATE: 16 BRPM | WEIGHT: 149.91 LBS | HEART RATE: 85 BPM | DIASTOLIC BLOOD PRESSURE: 87 MMHG | HEIGHT: 68 IN | SYSTOLIC BLOOD PRESSURE: 167 MMHG | TEMPERATURE: 98 F

## 2019-05-23 PROCEDURE — 99283 EMERGENCY DEPT VISIT LOW MDM: CPT

## 2019-05-23 RX ORDER — LOSARTAN POTASSIUM 100 MG/1
50 TABLET, FILM COATED ORAL ONCE
Refills: 0 | Status: COMPLETED | OUTPATIENT
Start: 2019-05-23 | End: 2019-05-23

## 2019-05-23 RX ORDER — LOSARTAN POTASSIUM 100 MG/1
1 TABLET, FILM COATED ORAL
Qty: 0 | Refills: 0 | DISCHARGE

## 2019-05-23 RX ORDER — LISINOPRIL 2.5 MG/1
1 TABLET ORAL
Qty: 0 | Refills: 0 | DISCHARGE

## 2019-05-23 RX ORDER — TAMSULOSIN HYDROCHLORIDE 0.4 MG/1
1 CAPSULE ORAL
Qty: 0 | Refills: 0 | DISCHARGE

## 2019-05-23 RX ORDER — TIOTROPIUM BROMIDE 18 UG/1
1 CAPSULE ORAL; RESPIRATORY (INHALATION)
Qty: 0 | Refills: 0 | DISCHARGE

## 2019-05-23 RX ORDER — OLANZAPINE 15 MG/1
1 TABLET, FILM COATED ORAL
Qty: 0 | Refills: 0 | DISCHARGE

## 2019-05-23 RX ORDER — ASPIRIN/CALCIUM CARB/MAGNESIUM 324 MG
1 TABLET ORAL
Qty: 0 | Refills: 0 | DISCHARGE

## 2019-05-23 RX ORDER — LOSARTAN/HYDROCHLOROTHIAZIDE 100MG-25MG
1 TABLET ORAL
Qty: 0 | Refills: 0 | DISCHARGE

## 2019-05-23 RX ADMIN — LOSARTAN POTASSIUM 50 MILLIGRAM(S): 100 TABLET, FILM COATED ORAL at 15:16

## 2019-05-23 NOTE — ED ADULT TRIAGE NOTE - WEIGHT IN LBS
Pt is a 89 y/o female who presented with  increased weakness and shortness of breath. Pt stated that she was having increased weakness over the past 3 days with increased shortness of breath with "occasional" cough with sputum. 149.9

## 2019-05-23 NOTE — ED PROVIDER NOTE - CLINICAL SUMMARY MEDICAL DECISION MAKING FREE TEXT BOX
Pt with hypertensive urgency, asymptomatic. Discussed case with brother and aide. Will increase dose of losartan and have pt f/u with PMD tomorrow.

## 2019-05-23 NOTE — ED ADULT NURSE NOTE - OBJECTIVE STATEMENT
Sent from [facility] , with high blood pressure. Pt complaining of mild lower pain. Denies injury.

## 2019-05-23 NOTE — ED PROVIDER NOTE - SKILLED NURSING FACILITY NOTE SUMMARY FREE TEXT FOR MDM OBTAINED AND REVIEWED OLD RECORDS QUESTION
Pt seems to have had labile blood pressures in the past, on 4/3 pt's BP was recorded at 155/86 and on 4/11 BP was recorded at 160/90.

## 2019-05-23 NOTE — ED PROVIDER NOTE - NSFOLLOWUPINSTRUCTIONS_ED_ALL_ED_FT
1) Follow-up with your Primary Medical Doctor as scheduled tomorrow. Call today / next business day for prompt follow-up.  2) Return to Emergency room for any worsening or persistent pain, weakness, fever, or any other concerning symptoms.  3) See attached instruction sheets for additional information, including information regarding signs and symptoms to look out for, reasons to seek immediate care and other important instructions.  4) Continue losartan as prescribed.  You may increase it to 100mg daily until your see your primary care doctor

## 2019-05-23 NOTE — ED PROVIDER NOTE - OBJECTIVE STATEMENT
69 y/o M pt w/ PMHx Acute (Undifferentiated) Schizophrenia, Asthma, Intrinsic, Chronic Latent Schizophrenia, Developmental Anomaly, Tachycardia presents to the ED BIBA for evaluation of HTN today. Pt states that he was at day program and they took his blood pressure after breakfast and they noted it to be elevated, sent pt via ambulance to ED for eval. Pt reports he feels fine, denies pain or any other complaints. Unable to obtain full hx secondary to pt's hx of MR.

## 2019-05-23 NOTE — ED PROVIDER NOTE - PROGRESS NOTE DETAILS
Spoke with brother Deal David [phone], discussed case and pt's blood pressure. Reports pt's normal BP runs around 140/80. Reports pt has had "mini-stroke" in the past which was why facility was concerned about BP and sent him to be evaluated. Brother has requested me to contact pt's caregiver Antonieta Motta [phone]. Spoke with Antonieta Motta [phone], reports she will be coming to the ED around 16:30. Will be taking pt to the new [facility] physician Dr Carlisle tomorrow ([phone]) BP improved, patient asymptomatic, Antonieta coming to  patient

## 2019-05-23 NOTE — ED PROVIDER NOTE - CONSTITUTIONAL, MLM
normal... Well appearing, well nourished, awake, alert, mild to moderate MR and in no apparent distress.

## 2019-06-01 ENCOUNTER — OUTPATIENT (OUTPATIENT)
Dept: OUTPATIENT SERVICES | Facility: HOSPITAL | Age: 69
LOS: 1 days | End: 2019-06-01
Payer: MEDICARE

## 2019-06-01 PROCEDURE — G9001: CPT

## 2019-06-07 DIAGNOSIS — Z71.89 OTHER SPECIFIED COUNSELING: ICD-10-CM

## 2019-08-15 ENCOUNTER — EMERGENCY (EMERGENCY)
Facility: HOSPITAL | Age: 69
LOS: 1 days | Discharge: HOME | End: 2019-08-15
Attending: EMERGENCY MEDICINE | Admitting: EMERGENCY MEDICINE
Payer: MEDICARE

## 2019-08-15 VITALS
RESPIRATION RATE: 17 BRPM | HEART RATE: 70 BPM | OXYGEN SATURATION: 99 % | SYSTOLIC BLOOD PRESSURE: 160 MMHG | DIASTOLIC BLOOD PRESSURE: 80 MMHG

## 2019-08-15 VITALS
WEIGHT: 149.91 LBS | OXYGEN SATURATION: 98 % | HEIGHT: 67 IN | SYSTOLIC BLOOD PRESSURE: 184 MMHG | DIASTOLIC BLOOD PRESSURE: 97 MMHG | HEART RATE: 76 BPM | RESPIRATION RATE: 16 BRPM | TEMPERATURE: 98 F

## 2019-08-15 DIAGNOSIS — I63.9 CEREBRAL INFARCTION, UNSPECIFIED: Chronic | ICD-10-CM

## 2019-08-15 DIAGNOSIS — H35.369 DRUSEN (DEGENERATIVE) OF MACULA, UNSPECIFIED EYE: Chronic | ICD-10-CM

## 2019-08-15 DIAGNOSIS — Z87.438 PERSONAL HISTORY OF OTHER DISEASES OF MALE GENITAL ORGANS: Chronic | ICD-10-CM

## 2019-08-15 DIAGNOSIS — J44.9 CHRONIC OBSTRUCTIVE PULMONARY DISEASE, UNSPECIFIED: Chronic | ICD-10-CM

## 2019-08-15 DIAGNOSIS — I10 ESSENTIAL (PRIMARY) HYPERTENSION: Chronic | ICD-10-CM

## 2019-08-15 DIAGNOSIS — F23 BRIEF PSYCHOTIC DISORDER: Chronic | ICD-10-CM

## 2019-08-15 DIAGNOSIS — H26.9 UNSPECIFIED CATARACT: Chronic | ICD-10-CM

## 2019-08-15 DIAGNOSIS — E78.00 PURE HYPERCHOLESTEROLEMIA, UNSPECIFIED: Chronic | ICD-10-CM

## 2019-08-15 DIAGNOSIS — G47.00 INSOMNIA, UNSPECIFIED: Chronic | ICD-10-CM

## 2019-08-15 PROCEDURE — 99284 EMERGENCY DEPT VISIT MOD MDM: CPT

## 2019-08-15 RX ADMIN — Medication 0.1 MILLIGRAM(S): at 12:31

## 2019-08-15 NOTE — ED PROVIDER NOTE - CONSTITUTIONAL, MLM
normal... Well appearing, well nourished, awake, alert, oriented to person, dementia and schizophrenia at baseline, NAD.

## 2019-08-15 NOTE — ED ADULT NURSE NOTE - PSH
Acute (undifferentiated) schizophrenia    Acute cataract    Acute CVA (cerebrovascular accident)    Acute insomnia    Advanced COPD    Benign essential HTN    Degenerative drusen, unspecified laterality    History of BPH    Pure hypercholesterolemia

## 2019-08-15 NOTE — ED PROVIDER NOTE - OBJECTIVE STATEMENT
70 y/o male with a PMHx of asthma, latent schizophrenia presents to the ED from c/o elevated blood pressure. Pt was at day care and was noted to have elevated BP and sent to ED. At this time pt's BP is normal. 70 y/o male with a PMHx of asthma, latent schizophrenia presents to the ED from c/o elevated blood pressure. [facility] and was noted to have elevated BP and sent to ED. At this time pt's BP is 187/86. 68 y/o male with a PMHx of asthma, latent schizophrenia presents to the ED from c/o elevated blood pressure. Pt was at [facility] today and was noted to have elevated BP and sent to ED. At this time pt's BP is 187/86.  Unable to obtain further hx due to pt dementia. 70 y/o male with a PMHx of asthma, schizophrenia presents to the ED from c/o elevated blood pressure. Pt was at day care at [facility] today and was noted to have elevated BP and sent to ED, pt does not know why he is here. At this time pt's BP is 187/86.  Unable to obtain further hx due to pt poor historian.

## 2019-10-05 NOTE — ED ADULT NURSE NOTE - ISOLATION TYPE:
Patient: Angelica Michaels    Procedure Summary     Date:  10/05/19 Room / Location:      Anesthesia Start:  0137 Anesthesia Stop:  0312    Procedure:  Labor Epidural Diagnosis:      Scheduled Providers:   Responsible Provider:  Antonio Collins M.D.    Anesthesia Type:  epidural ASA Status:  2          Final Anesthesia Type: epidural  Last vitals  BP   Blood Pressure: 112/69    Temp   36.8 °C (98.3 °F)    Pulse   Pulse: 93   Resp   17    SpO2   100 %      Anesthesia Post Evaluation    Patient location during evaluation: PACU  Patient participation: complete - patient participated  Level of consciousness: awake and alert    Airway patency: patent  Anesthetic complications: no  Cardiovascular status: hemodynamically stable  Respiratory status: acceptable  Hydration status: euvolemic    PONV: none           Nurse Pain Score: 0 (NPRS)        
None

## 2020-01-15 NOTE — ED ADULT NURSE NOTE - NEURO WDL
[Normal] : Anus and perineum: Normal sphincter tone, no masses, no prolapse. [de-identified] : ROZ [de-identified] : no lymphadenopathy [de-identified] : ROZ, vaginal atrophy noted, narrow apex making it difficult to visualize the cervix [de-identified] : Skin dermatitis now resolved. Vulvar incision now 80% healed. Clean granulation tissue. [de-identified] : unable to visualize Alert and oriented to person, place and time, memory intact, behavior appropriate to situation, PERRL.

## 2021-09-13 NOTE — PROGRESS NOTE BEHAVIORAL HEALTH - NSBHFUPINTERVALHXFT_PSY_A_CORE
Monitor summary: SR 66-82, WA .14, QRS .08, QT .42 per strip from monitor room.     Pt with slurred speech, unable to fully verbalize his complaints. Pt denies feeling depressed, or anxious, denies paranoia, hallucinations. no si/hi. calm overnight, no prns. pt off his psych meds since admission. collateral obtained from brother, states pt has difficulty speaking due to new stroke, no acute psych symptoms.

## 2021-10-23 NOTE — OCCUPATIONAL THERAPY INITIAL EVALUATION ADULT - FINE MOTOR COORDINATION, LEFT HAND, FINGER TO NOSE, OT EVAL
Spoke to patient, reviewed all test results, stable vitamin D, discontinue prescription vitamin D not to take anything for two 3 months enough to start vitamin D3 over-the-counter 1000 units daily.   TSH is low normal, she will continue taking levothyroxine
Verified name and . Patient wants to confirm that she should continue with the following doses of her medications based on normal lab results. She is currently taking:    Levothyroxine- 50 mcg for 5 days and 25 mcg for 2 days.   Vitamin D- 50,000 uni
normal performance

## 2021-12-14 NOTE — STROKE CODE NOTE - NIH STROKE SCALE: 4. FACIAL PALSY, QM
Left Message to call  to schedule pre op [employer] class.   (2) Partial paralysis (total or near-total paralysis of lower face)

## 2023-01-24 NOTE — STROKE CODE NOTE - ER ARRIVAL: DATE/TIME
800-1000 Calories daily TOTAL.  More than 50% of the diet should be plant based (vegetables more than fruit) with no processed foods.  Limit meat products.  If you eat meat- opt for Fish/Chicken/Turkey.  Avoid Pork and Red Meat.  Opt for water- 10- 12 glasses daily- avoid sweetened beverages altogether (No Soda/Juice/Sweetened drinks- Limit Alcohol).  Avoid fast food/fried food.   Limit breads/pasta/rice/potatoes but if you must- eat whole grain or wheat versions over white bread products- this includes rice and pastas.  150 minutes of cardiovascular exercise per week and weight train two to three times per week hitting as many muscle groups as possible.  Make sure to eat at least 50-75 grams of protein daily.    05-Aug-2017 09:38

## 2023-11-08 NOTE — SPEECH LANGUAGE PATHOLOGY EVALUATION - OBJECTS: FIELD OF 3
Marvin Ramsey Patient Age: 61 year old  MESSAGE: Interpreting service used: No    Insurance on file confirmed with caller: Yes    IM/FP- Medication Question-     Name of the Pharmacy: [facility]     Name of the medication:     Question about: Review Current Medication List        Select provider's home site [facility] CALL TO CALL CENTER [facility] QUEUE- ROUTE MESSAGE TO CALL CENTER [facility] POOL ([phone])    Message read back to caller for accuracy: Yes       ALLERGIES:  Levaquin  Current Outpatient Medications   Medication Sig Dispense Refill   • tirzepatide (Mounjaro) 7.5 MG/0.5ML Solution Pen-injector Inject 7.5 mg into the skin every 7 days. Indications: Type 2 Diabetes Collaborating MD: Dr. Huong Ron MD 84 mL 3   • allopurinol (ZYLOPRIM) 300 MG tablet Take 1 tablet by mouth daily. Collaborating MD: Dr. Huong Ron MD 90 tablet 3   • insulin degludec (Tresiba FlexTouch) 100 UNIT/ML pen-injector Inject 15 Units into the skin daily. Prime 2 units before each dose.  Collaborating MD: Dr. Huong Ron MD 9 mL 5   • ipratropium-albuterol (DUONEB) 0.5-2.5 (3) MG/3ML nebulizer solution Take 3 mLs by nebulization every 6 hours as needed for Wheezing or Shortness of Breath. Collaborating MD: Dr. Huong Ron MD 90 mL 1   • lisinopril (ZESTRIL) 10 MG tablet Take 1 tablet by mouth daily. Collaborating MD: Dr. Huong Ron MD 90 tablet 3   • atorvastatin (LIPITOR) 40 MG tablet Take 1 tablet by mouth daily. 90 tablet 3   • empagliflozin (Jardiance) 25 MG tablet Take 1 tablet by mouth daily (before breakfast). 90 tablet 3   • metformin (GLUCOPHAGE) 1000 MG tablet Take 1 tablet by mouth in the morning and 1 tablet in the evening. Collaborating MD: Dr. Huong Ron  tablet 3   • blood glucose test strip Test blood glucose 2x daily. Dx: E11.9. Meter: Verio 200 strip 3   • Sodium Sulfate-Mag Sulfate-KCl 9617-369-218 MG Tab Take 12 tablets by mouth as directed. See Colonoscopy Instructions. 24 tablet 0    • Insulin Pen Needle (Pen Needles) 32G X 4 MM Misc Use with tresiba daily  Collaborating MD: Dr. Huong Ron MD 30 each 5   • aspirin 81 MG tablet Take 1 tablet by mouth daily.       No current facility-administered medications for this visit.     PHARMACY to use: Shown below            Pharmacy preference(s) on file:   [id number] - [place], [place] - [address] AT [facility] [address]  [address]  [facility] [address]  Phone: [phone] Fax: [phone]    [facility] - [place], [place] - [address]  [address] [place] [place] [address]  Phone: [phone] Fax: [phone]      CALL BACK INFO: Ok to leave response (including medical information) on answering machine      PCP: Gina Light PA-C         INS: Payor: [employer] [place] / Plan: PPO [id number] / Product Type: PPO MISC   PATIENT ADDRESS:  [id number] [place] [address] [address] [place] [address]     yes

## 2023-11-10 ENCOUNTER — EMERGENCY (EMERGENCY)
Facility: HOSPITAL | Age: 73
LOS: 1 days | Discharge: HOME | End: 2023-11-10
Attending: EMERGENCY MEDICINE | Admitting: EMERGENCY MEDICINE
Payer: MEDICARE

## 2023-11-10 VITALS
WEIGHT: 121.25 LBS | TEMPERATURE: 98 F | HEIGHT: 66 IN | DIASTOLIC BLOOD PRESSURE: 89 MMHG | HEART RATE: 78 BPM | OXYGEN SATURATION: 98 % | RESPIRATION RATE: 15 BRPM | SYSTOLIC BLOOD PRESSURE: 144 MMHG

## 2023-11-10 VITALS
RESPIRATION RATE: 16 BRPM | SYSTOLIC BLOOD PRESSURE: 140 MMHG | HEART RATE: 81 BPM | OXYGEN SATURATION: 99 % | DIASTOLIC BLOOD PRESSURE: 88 MMHG

## 2023-11-10 DIAGNOSIS — H26.9 UNSPECIFIED CATARACT: Chronic | ICD-10-CM

## 2023-11-10 DIAGNOSIS — I63.9 CEREBRAL INFARCTION, UNSPECIFIED: Chronic | ICD-10-CM

## 2023-11-10 DIAGNOSIS — E78.00 PURE HYPERCHOLESTEROLEMIA, UNSPECIFIED: Chronic | ICD-10-CM

## 2023-11-10 DIAGNOSIS — H35.369 DRUSEN (DEGENERATIVE) OF MACULA, UNSPECIFIED EYE: Chronic | ICD-10-CM

## 2023-11-10 DIAGNOSIS — J44.9 CHRONIC OBSTRUCTIVE PULMONARY DISEASE, UNSPECIFIED: Chronic | ICD-10-CM

## 2023-11-10 DIAGNOSIS — F23 BRIEF PSYCHOTIC DISORDER: Chronic | ICD-10-CM

## 2023-11-10 DIAGNOSIS — I10 ESSENTIAL (PRIMARY) HYPERTENSION: Chronic | ICD-10-CM

## 2023-11-10 DIAGNOSIS — G47.00 INSOMNIA, UNSPECIFIED: Chronic | ICD-10-CM

## 2023-11-10 DIAGNOSIS — Z87.438 PERSONAL HISTORY OF OTHER DISEASES OF MALE GENITAL ORGANS: Chronic | ICD-10-CM

## 2023-11-10 PROCEDURE — 99284 EMERGENCY DEPT VISIT MOD MDM: CPT | Mod: 25

## 2023-11-10 PROCEDURE — 72125 CT NECK SPINE W/O DYE: CPT | Mod: MA

## 2023-11-10 PROCEDURE — 99284 EMERGENCY DEPT VISIT MOD MDM: CPT | Mod: FS,25

## 2023-11-10 PROCEDURE — 12011 RPR F/E/E/N/L/M 2.5 CM/<: CPT

## 2023-11-10 PROCEDURE — 70450 CT HEAD/BRAIN W/O DYE: CPT | Mod: MA

## 2023-11-10 PROCEDURE — 72125 CT NECK SPINE W/O DYE: CPT | Mod: 26,MA

## 2023-11-10 PROCEDURE — 12051 INTMD RPR FACE/MM 2.5 CM/<: CPT

## 2023-11-10 PROCEDURE — 70450 CT HEAD/BRAIN W/O DYE: CPT | Mod: 26,MA

## 2023-11-10 NOTE — ED PROVIDER NOTE - ENMT, MLM
Airway patent, mild ecchymosis lateral aspect of right orbit. no hematoma. no nasal or jaw tenderness

## 2023-11-10 NOTE — ED PROVIDER NOTE - PROGRESS NOTE DETAILS
Patient stable.  Denies any pain or complaints.  CAT scan of head and neck shows no acute abnormality.  Laceration very superficial in nature, repaired with Dermabond and Steri-Strips.  Copies of CAT scan provided to patient.  Medically cleared to return to group home.  Spoke with Sue, nurse at group [facility].  Comfortable with plan

## 2023-11-10 NOTE — ED ADULT NURSE NOTE - NS_SISCREENINGSR_GEN_ALL_ED
Pt stopped in the office. Patch fell off. Patch has been replaced. Patch was placed horizontally due to indentation in breast/sternum area. Negative

## 2023-11-10 NOTE — ED ADULT TRIAGE NOTE - CHIEF COMPLAINT QUOTE
patient is brought in by EMS RCA from [facility] adult care day program, going back to his group home, patient fell and a lac on his right eyebrow.

## 2023-11-10 NOTE — ED PROVIDER NOTE - PATIENT PORTAL LINK FT
You can access the FollowMyHealth Patient Portal offered by [facility] by registering at the following website: [email or web address]. By joining [employer]’s FollowMyHealth portal, you will also be able to view your health information using other applications (apps) compatible with our system.

## 2023-11-10 NOTE — ED ADULT NURSE NOTE - NSFALLRISKINTERV_ED_ALL_ED
Assistance OOB with selected safe patient handling equipment if applicable/Assistance with ambulation/Communicate fall risk and risk factors to all staff, patient, and family/Monitor gait and stability/Provide visual cue: yellow wristband, yellow gown, etc/Reinforce activity limits and safety measures with patient and family/Call bell, personal items and telephone in reach/Instruct patient to call for assistance before getting out of bed/chair/stretcher/Non-slip footwear applied when patient is off stretcher/[place] to call system/Physically safe environment - no spills, clutter or unnecessary equipment/Purposeful Proactive Rounding/Room/bathroom lighting operational, light cord in reach

## 2023-11-10 NOTE — ED PROVIDER NOTE - NSICDXPASTSURGICALHX_GEN_ALL_CORE_FT
PAST SURGICAL HISTORY:  Acute (undifferentiated) schizophrenia     Acute cataract     Acute CVA (cerebrovascular accident)     Acute insomnia     Advanced COPD     Benign essential HTN     Degenerative drusen, unspecified laterality     History of BPH     Pure hypercholesterolemia

## 2023-11-10 NOTE — ED PROVIDER NOTE - CLINICAL SUMMARY MEDICAL DECISION MAKING FREE TEXT BOX
73-year-old male with past medical history of asthma, schizophrenia, CVA, presents to the emergency department by ambulance with report of fall laceration to right periorbital area, patient awake alert, follows commands, noted 0.5cm lac to right outer periorbital area, Follow-up CT head, CT cervical spine, wound to be cleaned, and repaired.

## 2023-11-10 NOTE — ED PROVIDER NOTE - PROVIDER TOKENS
PROVIDER:[TOKEN:[[id number]],FOLLOWUP:[1-3 Days]],PROVIDER:[TOKEN:[[id number]],FOLLOWUP:[1-3 Days]]

## 2023-11-10 NOTE — ED ADULT NURSE NOTE - CHIEF COMPLAINT QUOTE
patient is brought in by EMS RCA from [facility] adult care day program, going back to his group home, patient fell and a lac on his right eyebrow.

## 2023-11-10 NOTE — ED PROVIDER NOTE - NSICDXPASTMEDICALHX_GEN_ALL_CORE_FT
PAST MEDICAL HISTORY:  Acute (Undifferentiated) Schizophrenia     Asthma, Intrinsic     Chronic Latent Schizophrenia     Developmental Anomaly     Tachycardia

## 2023-11-10 NOTE — ED PROVIDER NOTE - OBJECTIVE STATEMENT
73-year-old male with history of tachycardia, developmental anomaly, asthma, schizophrenia, COPD, BPH, hyperlipidemia, hypertension, and history of CVA brought in by EMS for evaluation of fall prior to arrival and right-sided facial laceration.  Patient is a resident at [facility].  Was at [facility] day [facility].  While he was getting into the cab to come back to [facility], fell and hit right side of face.  Unsure of what he hit his face on.  No LOC.  Does not take any blood thinners.  Patient denies any pain or complaints.  Denies any neck or back pain.  Denies headache or dizziness.  Denies any extremity pain.  Denies chest pain or abdominal pain.  History of frequent falls and gait issues per EMS and group home.  Patient poor historian.  Resident at a group home  PCP Stacy Mocnada NP

## 2023-11-10 NOTE — ED PROVIDER NOTE - NEUROLOGICAL, MLM
Alert and oriented to place and person. symmetric eyebrow raise and smile. elevates tongue and shoulders without difficulty. neg pronator drift. good  strength bilaterally

## 2023-11-10 NOTE — ED PROVIDER NOTE - NSFOLLOWUPINSTRUCTIONS_ED_ALL_ED_FT
keep steri strips on until they fall off  follow up with neurologist if frequent falls continue, referral provided if needed       Tissue Adhesive Wound Care  Some cuts and wounds can be closed with skin glue (tissue adhesive). Skin glue holds the skin together and helps your wound heal faster. Skin glue goes away on its own as your wound gets better. It is important to take good care of your wound while it heals.    Follow these instructions at home:  Wound care    Washing hands with soap and water.  Two wounds closed with skin glue. One is normal. The other is red with pus and infected.  If a bandage (dressing) was put on the wound, keep it clean and dry.  Follow instructions from your doctor about how often to change the bandage. Make sure you:  Wash your hands with soap and water for at least 20 seconds before and after you change your bandage. If you cannot use soap and water, use hand .  Change the bandage as often as told by your doctor.  Leave skin glue in place. It will fall off on its own after 7–10 days.  Do not scratch, rub, or pick at the skin glue.  Do not put tape over the skin glue. The skin glue could come off when you take the tape off.  Check your wound daily to make sure it is not starting to reopen.  Protect the wound from another injury.  Check your wound area every day for signs of infection. Check for:  More redness, swelling, or pain.  Fluid or blood.  Warmth or a rash around the wound.  Pus or a bad smell.  Bathing    Do not take baths, swim, or use a hot tub. Ask your doctor about taking showers or sponge baths.  You can usually shower after the first 24 hours.  Cover the bandage with a watertight covering when you take a shower.  Do not soak the area where skin glue has been used.  Do not use soaps or put creams on your wound.  Eating and drinking    Eat healthy foods to help the wound heal. As told by your doctor, eat:  Foods rich in protein. These include meat, fish, eggs, dairy, beans, and nuts.  Foods rich in vitamin A. These include carrots and dark green, leafy vegetables.  Foods rich in vitamin C. These include oranges, tomatoes, broccoli, and peppers.  Drink enough fluid to keep your pee (urine) pale yellow.  General instructions    Protect your wound from the sun when you are outside for the first 6 months, or for as long as told by your doctor. Put on sunscreen with an SPF of 30 or higher around the scar, or cover it up.  Take over-the-counter and prescription medicines only as told by your doctor.  Do not smoke or use any products that contain nicotine or tobacco. These can delay wound healing. If you need help quitting, ask your doctor.  Keep all follow-up visits.  Contact a doctor if:  The glue used on your wound gets soaked with blood or falls off before your wound has healed. The glue may need to be replaced.  You have a fever or chills.  You have redness, swelling, or pain around your wound.  You have fluid or blood coming from your wound.  You get a rash after the glue is put on.  Get help right away if:  Your wound breaks open.  You have a red streak at the area around your wound.  You have pus or a bad smell coming from your wound.  Summary  Some cuts and wounds can be closed with skin glue (tissue adhesive). Skin glue holds the skin together and helps your wound heal faster.  It is important to take good care of your wound while it heals.  Wash your hands with soap and water for at least 20 seconds before and after you change your bandage.  Eat healthy foods.  Check your wound every day for signs of infection.  This information is not intended to replace advice given to you by your health care provider. Make sure you discuss any questions you have with your health care provider. Medically clear to return to program on 11/11/23    keep steri strips on until they fall off  follow up with neurologist if frequent falls continue, referral provided if needed       Tissue Adhesive Wound Care  Some cuts and wounds can be closed with skin glue (tissue adhesive). Skin glue holds the skin together and helps your wound heal faster. Skin glue goes away on its own as your wound gets better. It is important to take good care of your wound while it heals.    Follow these instructions at home:  Wound care    Washing hands with soap and water.  Two wounds closed with skin glue. One is normal. The other is red with pus and infected.  If a bandage (dressing) was put on the wound, keep it clean and dry.  Follow instructions from your doctor about how often to change the bandage. Make sure you:  Wash your hands with soap and water for at least 20 seconds before and after you change your bandage. If you cannot use soap and water, use hand .  Change the bandage as often as told by your doctor.  Leave skin glue in place. It will fall off on its own after 7–10 days.  Do not scratch, rub, or pick at the skin glue.  Do not put tape over the skin glue. The skin glue could come off when you take the tape off.  Check your wound daily to make sure it is not starting to reopen.  Protect the wound from another injury.  Check your wound area every day for signs of infection. Check for:  More redness, swelling, or pain.  Fluid or blood.  Warmth or a rash around the wound.  Pus or a bad smell.  Bathing    Do not take baths, swim, or use a hot tub. Ask your doctor about taking showers or sponge baths.  You can usually shower after the first 24 hours.  Cover the bandage with a watertight covering when you take a shower.  Do not soak the area where skin glue has been used.  Do not use soaps or put creams on your wound.  Eating and drinking    Eat healthy foods to help the wound heal. As told by your doctor, eat:  Foods rich in protein. These include meat, fish, eggs, dairy, beans, and nuts.  Foods rich in vitamin A. These include carrots and dark green, leafy vegetables.  Foods rich in vitamin C. These include oranges, tomatoes, broccoli, and peppers.  Drink enough fluid to keep your pee (urine) pale yellow.  General instructions    Protect your wound from the sun when you are outside for the first 6 months, or for as long as told by your doctor. Put on sunscreen with an SPF of 30 or higher around the scar, or cover it up.  Take over-the-counter and prescription medicines only as told by your doctor.  Do not smoke or use any products that contain nicotine or tobacco. These can delay wound healing. If you need help quitting, ask your doctor.  Keep all follow-up visits.  Contact a doctor if:  The glue used on your wound gets soaked with blood or falls off before your wound has healed. The glue may need to be replaced.  You have a fever or chills.  You have redness, swelling, or pain around your wound.  You have fluid or blood coming from your wound.  You get a rash after the glue is put on.  Get help right away if:  Your wound breaks open.  You have a red streak at the area around your wound.  You have pus or a bad smell coming from your wound.  Summary  Some cuts and wounds can be closed with skin glue (tissue adhesive). Skin glue holds the skin together and helps your wound heal faster.  It is important to take good care of your wound while it heals.  Wash your hands with soap and water for at least 20 seconds before and after you change your bandage.  Eat healthy foods.  Check your wound every day for signs of infection.  This information is not intended to replace advice given to you by your health care provider. Make sure you discuss any questions you have with your health care provider.

## 2023-11-10 NOTE — ED PROVIDER NOTE - CARE PROVIDER_API CALL
Leonor Mendoza  Neurology  [address] [facility] [address] [facility], [address]  [place] [address]  Phone: [phone]  Fax: [phone]  Follow Up Time: 1-3 Days    Carmenza Richardson  Neurology  [address] [place] [address]  Phone: [phone]  Fax: [phone]  Follow Up Time: 1-3 Days

## 2023-11-14 ENCOUNTER — OFFICE (OUTPATIENT)
Dept: URBAN - METROPOLITAN AREA CLINIC 34 | Facility: CLINIC | Age: 73
Setting detail: OPHTHALMOLOGY
End: 2023-11-14
Payer: MEDICARE

## 2023-11-14 VITALS — HEIGHT: 60 IN

## 2023-11-14 DIAGNOSIS — F20.5: ICD-10-CM

## 2023-11-14 DIAGNOSIS — H25.13: ICD-10-CM

## 2023-11-14 PROCEDURE — 99204 OFFICE O/P NEW MOD 45 MIN: CPT | Performed by: OPHTHALMOLOGY

## 2023-11-14 ASSESSMENT — REFRACTION_MANIFEST
OD_CYLINDER: +1.00
OD_AXIS: 150
OD_VA1: 20/20
OD_SPHERE: -1.75

## 2023-11-14 ASSESSMENT — CONFRONTATIONAL VISUAL FIELD TEST (CVF)
OD_FINDINGS: FULL
OS_FINDINGS: FULL

## 2023-11-14 ASSESSMENT — REFRACTION_AUTOREFRACTION: OS_SPHERE: UNABLE

## 2023-11-14 ASSESSMENT — SPHEQUIV_DERIVED: OD_SPHEQUIV: -1.25

## 2023-11-20 NOTE — OCCUPATIONAL THERAPY INITIAL EVALUATION ADULT - ANTICIPATED DISCHARGE DISPOSITION, OT EVAL
No
Home with no skilled OT needs, Home with supervision/assist for all functional actvities and ADLs as needed

## 2023-11-22 NOTE — ED ADULT NURSE NOTE - EENT WDL
Eyes with no visual disturbances.  Ears clean and dry and no hearing difficulties. Nose with pink mucosa and no drainage.  Mouth mucous membranes moist and pink.  No tenderness or swelling to throat or neck. 101

## 2024-01-23 ENCOUNTER — OFFICE (OUTPATIENT)
Facility: LOCATION | Age: 74
Setting detail: OPHTHALMOLOGY
End: 2024-01-23
Payer: MEDICARE

## 2024-01-23 DIAGNOSIS — H25.13: ICD-10-CM

## 2024-01-23 DIAGNOSIS — F20.5: ICD-10-CM

## 2024-01-23 PROCEDURE — 92014 COMPRE OPH EXAM EST PT 1/>: CPT | Performed by: OPHTHALMOLOGY

## 2024-01-23 ASSESSMENT — REFRACTION_MANIFEST
OD_CYLINDER: +1.00
OD_SPHERE: -1.75
OD_AXIS: 150
OD_VA1: 20/20

## 2024-01-23 ASSESSMENT — SPHEQUIV_DERIVED: OD_SPHEQUIV: -1.25

## 2024-01-23 ASSESSMENT — REFRACTION_AUTOREFRACTION
OD_SPHERE: -4.75
OS_SPHERE: UNABLE

## 2024-01-23 ASSESSMENT — CONFRONTATIONAL VISUAL FIELD TEST (CVF)
OD_FINDINGS: FULL
OS_FINDINGS: FULL

## 2024-02-07 NOTE — ED ADULT NURSE NOTE - MUSCULOSKELETAL WDL
4 = No assist / stand by assistance
Full range of motion of upper and lower extremities, no joint tenderness/swelling.

## 2024-02-16 ENCOUNTER — APPOINTMENT (OUTPATIENT)
Dept: OPHTHALMOLOGY | Facility: CLINIC | Age: 74
End: 2024-02-16
Payer: MEDICARE

## 2024-02-16 ENCOUNTER — NON-APPOINTMENT (OUTPATIENT)
Age: 74
End: 2024-02-16

## 2024-02-16 PROCEDURE — 92004 COMPRE OPH EXAM NEW PT 1/>: CPT

## 2024-04-09 ENCOUNTER — APPOINTMENT (OUTPATIENT)
Dept: OPHTHALMOLOGY | Facility: CLINIC | Age: 74
End: 2024-04-09

## 2024-04-15 ENCOUNTER — Encounter (OUTPATIENT)
Facility: LOCATION | Age: 74
Setting detail: OPHTHALMOLOGY
End: 2024-04-15
Payer: MEDICARE

## 2024-07-12 ENCOUNTER — OUTPATIENT (OUTPATIENT)
Dept: OUTPATIENT SERVICES | Facility: HOSPITAL | Age: 74
LOS: 1 days | End: 2024-07-12
Payer: MEDICARE

## 2024-07-12 VITALS
HEIGHT: 62 IN | HEART RATE: 78 BPM | TEMPERATURE: 98 F | DIASTOLIC BLOOD PRESSURE: 70 MMHG | OXYGEN SATURATION: 98 % | RESPIRATION RATE: 18 BRPM | WEIGHT: 106.04 LBS | SYSTOLIC BLOOD PRESSURE: 140 MMHG

## 2024-07-12 DIAGNOSIS — Z01.818 ENCOUNTER FOR OTHER PREPROCEDURAL EXAMINATION: ICD-10-CM

## 2024-07-12 DIAGNOSIS — H26.9 UNSPECIFIED CATARACT: Chronic | ICD-10-CM

## 2024-07-12 DIAGNOSIS — E78.00 PURE HYPERCHOLESTEROLEMIA, UNSPECIFIED: Chronic | ICD-10-CM

## 2024-07-12 DIAGNOSIS — J44.9 CHRONIC OBSTRUCTIVE PULMONARY DISEASE, UNSPECIFIED: Chronic | ICD-10-CM

## 2024-07-12 DIAGNOSIS — H35.369 DRUSEN (DEGENERATIVE) OF MACULA, UNSPECIFIED EYE: Chronic | ICD-10-CM

## 2024-07-12 DIAGNOSIS — H25.812 COMBINED FORMS OF AGE-RELATED CATARACT, LEFT EYE: ICD-10-CM

## 2024-07-12 DIAGNOSIS — I10 ESSENTIAL (PRIMARY) HYPERTENSION: Chronic | ICD-10-CM

## 2024-07-12 DIAGNOSIS — F23 BRIEF PSYCHOTIC DISORDER: Chronic | ICD-10-CM

## 2024-07-12 DIAGNOSIS — Z87.438 PERSONAL HISTORY OF OTHER DISEASES OF MALE GENITAL ORGANS: Chronic | ICD-10-CM

## 2024-07-12 DIAGNOSIS — G47.00 INSOMNIA, UNSPECIFIED: Chronic | ICD-10-CM

## 2024-07-12 DIAGNOSIS — I63.9 CEREBRAL INFARCTION, UNSPECIFIED: Chronic | ICD-10-CM

## 2024-07-12 PROCEDURE — G0463: CPT

## 2024-07-12 RX ORDER — HALOPERIDOL DECANOATE 100 MG/ML
1 VIAL (ML) INTRAMUSCULAR
Refills: 0 | DISCHARGE

## 2024-07-12 RX ORDER — DONEPEZIL HYDROCHLORIDE 10 MG/1
1 TABLET, FILM COATED ORAL
Refills: 0 | DISCHARGE

## 2024-07-12 RX ORDER — ALFUZOSIN HYDROCHLORIDE 10 MG/1
0 TABLET, EXTENDED RELEASE ORAL
Refills: 0 | DISCHARGE

## 2024-07-12 RX ORDER — OLANZAPINE 2.5 MG/1
1 TABLET, FILM COATED ORAL
Refills: 0 | DISCHARGE

## 2024-07-12 RX ORDER — METFORMIN HYDROCHLORIDE 850 MG/1
1 TABLET, FILM COATED ORAL
Refills: 0 | DISCHARGE

## 2024-07-12 RX ORDER — BENZTROPINE MESYLATE 1 MG
1 TABLET ORAL
Refills: 0 | DISCHARGE

## 2024-07-12 RX ORDER — DIPHENHYDRAMINE HCL 12.5MG/5ML
2 ELIXIR ORAL
Refills: 0 | DISCHARGE

## 2024-07-12 RX ORDER — ASPIRIN 325 MG/1
0 TABLET, FILM COATED ORAL
Refills: 0 | DISCHARGE

## 2024-07-12 RX ORDER — AMLODIPINE BESYLATE 2.5 MG/1
1 TABLET ORAL
Refills: 0 | DISCHARGE

## 2024-07-12 NOTE — H&P PST ADULT - NSICDXPASTSURGICALHX_GEN_ALL_CORE_FT
PAST SURGICAL HISTORY:  Acute (undifferentiated) schizophrenia     Acute cataract     Acute CVA (cerebrovascular accident)     Acute insomnia     Advanced COPD     Benign essential HTN     Degenerative drusen, unspecified laterality     History of BPH     Pure hypercholesterolemia      PAST SURGICAL HISTORY:  Acute (undifferentiated) schizophrenia     Acute cataract     Acute insomnia     Benign essential HTN     Degenerative drusen, unspecified laterality     History of BPH

## 2024-07-12 NOTE — H&P PST ADULT - HISTORY OF PRESENT ILLNESS
Pt is a 67 year old male with a phx of schizophrenia, BPH, and HTN. Pt was at assisted living facility this morning and had witnessed onset of left sided facial droop, slurred speech and right sided hemiparesis at 9:00am. He was then taken to [facility] and found to have initial NIHSS of 9. CT head officially reported at 9:51 and he received a TPA bolus and completed full dose of TPa at 11AM. Pt has having BP greater than 180 systolic therefore received Labetalol 10mg ivp x 2. He was transferred to [facility] for further workup. On physical exam at the time of presentation pt had no drift X 4 and 5/5 strength throughout. Seemingly the right sided hemiparesis resolved. However he still had productive aphasia and has severe dysarthria.     NIHSS - 8  MRS - 4 Pt is a 74 year old male with a phx of schizophrenia, BPH, and HTN.Patient c/o blurry vision left eye. Scheduled for cataract extraction to left eye.

## 2024-07-12 NOTE — H&P PST ADULT - NSANTHOSAYNRD_GEN_A_CORE
No. AKBAR screening performed.  STOP BANG Legend: 0-2 = LOW Risk; 3-4 = INTERMEDIATE Risk; 5-8 = HIGH Risk

## 2024-07-12 NOTE — H&P PST ADULT - NSICDXPASTMEDICALHX_GEN_ALL_CORE_FT
PAST MEDICAL HISTORY:  Acute (Undifferentiated) Schizophrenia     Alzheimers disease     Asthma, Intrinsic     Chronic Latent Schizophrenia     Developmental Anomaly     Hypertension     Slurred speech     Tachycardia      PAST MEDICAL HISTORY:  Acute (Undifferentiated) Schizophrenia     Acute cataract     Acute CVA (cerebrovascular accident)     Acute insomnia     Advanced COPD     Alzheimers disease     Asthma, Intrinsic     Benign essential HTN     Chronic Latent Schizophrenia     Degenerative drusen, unspecified laterality     Developmental Anomaly     H/O obsessive compulsive personality disorder     Hypertension     Pure hypercholesterolemia     Slurred speech     Tachycardia

## 2024-07-12 NOTE — H&P PST ADULT - ASSESSMENT
75 y/o male is scheduled for complex cataract extraction   will obtain medical and cardiology clearance  pre-op instructions provided  Instructions provided on medications to continue and to take the day morning of surgery  Instructions provided to patient  and care giver, verbalized understanding  Metformin: Hold 24 hrs prior to surgery

## 2024-07-18 ENCOUNTER — APPOINTMENT (OUTPATIENT)
Dept: OPHTHALMOLOGY | Facility: HOSPITAL | Age: 74
End: 2024-07-18

## 2024-07-19 ENCOUNTER — APPOINTMENT (OUTPATIENT)
Dept: OPHTHALMOLOGY | Facility: CLINIC | Age: 74
End: 2024-07-19

## 2024-07-23 ENCOUNTER — APPOINTMENT (OUTPATIENT)
Dept: OPHTHALMOLOGY | Facility: CLINIC | Age: 74
End: 2024-07-23

## 2024-07-26 PROBLEM — G47.00 INSOMNIA, UNSPECIFIED: Chronic | Status: ACTIVE | Noted: 2024-07-12

## 2024-07-26 PROBLEM — H35.369 DRUSEN (DEGENERATIVE) OF MACULA, UNSPECIFIED EYE: Chronic | Status: ACTIVE | Noted: 2024-07-12

## 2024-07-26 PROBLEM — J44.9 CHRONIC OBSTRUCTIVE PULMONARY DISEASE, UNSPECIFIED: Chronic | Status: ACTIVE | Noted: 2024-07-12

## 2024-07-26 PROBLEM — G30.9 ALZHEIMER'S DISEASE, UNSPECIFIED: Chronic | Status: ACTIVE | Noted: 2024-07-12

## 2024-07-26 PROBLEM — Z86.59 PERSONAL HISTORY OF OTHER MENTAL AND BEHAVIORAL DISORDERS: Chronic | Status: ACTIVE | Noted: 2024-07-12

## 2024-07-26 PROBLEM — I10 ESSENTIAL (PRIMARY) HYPERTENSION: Chronic | Status: ACTIVE | Noted: 2024-07-12

## 2024-07-26 PROBLEM — H26.9 UNSPECIFIED CATARACT: Chronic | Status: ACTIVE | Noted: 2024-07-12

## 2024-07-26 PROBLEM — I63.9 CEREBRAL INFARCTION, UNSPECIFIED: Chronic | Status: ACTIVE | Noted: 2024-07-12

## 2024-07-26 PROBLEM — R47.81 SLURRED SPEECH: Chronic | Status: ACTIVE | Noted: 2024-07-12

## 2024-07-26 PROBLEM — E78.00 PURE HYPERCHOLESTEROLEMIA, UNSPECIFIED: Chronic | Status: ACTIVE | Noted: 2024-07-12

## 2024-08-27 ENCOUNTER — APPOINTMENT (OUTPATIENT)
Dept: OPHTHALMOLOGY | Facility: CLINIC | Age: 74
End: 2024-08-27

## 2024-11-26 ENCOUNTER — APPOINTMENT (OUTPATIENT)
Dept: OPHTHALMOLOGY | Facility: CLINIC | Age: 74
End: 2024-11-26

## 2025-01-16 ENCOUNTER — APPOINTMENT (OUTPATIENT)
Dept: OPHTHALMOLOGY | Facility: HOSPITAL | Age: 75
End: 2025-01-16

## 2025-01-17 ENCOUNTER — APPOINTMENT (OUTPATIENT)
Dept: OPHTHALMOLOGY | Facility: CLINIC | Age: 75
End: 2025-01-17

## 2025-01-24 ENCOUNTER — APPOINTMENT (OUTPATIENT)
Dept: OPHTHALMOLOGY | Facility: CLINIC | Age: 75
End: 2025-01-24

## 2025-02-25 ENCOUNTER — APPOINTMENT (OUTPATIENT)
Dept: OPHTHALMOLOGY | Facility: CLINIC | Age: 75
End: 2025-02-25

## 2025-04-01 ENCOUNTER — NON-APPOINTMENT (OUTPATIENT)
Age: 75
End: 2025-04-01